# Patient Record
Sex: FEMALE | Race: ASIAN | Employment: FULL TIME | URBAN - METROPOLITAN AREA
[De-identification: names, ages, dates, MRNs, and addresses within clinical notes are randomized per-mention and may not be internally consistent; named-entity substitution may affect disease eponyms.]

---

## 2019-03-21 NOTE — PROGRESS NOTES
Melisa Walls Consultation    Dear Dr. Bao Alexander    Thank you for requesting ultrasound evaluation and maternal fetal medicine consultation on your patient Thierry Gunnjaelyn.   As you are aware she is a 26year old female with a Singlet a level II ultrasound today which I interpreted the results and reviewed them with the patient. Indication: gdm. Maternal age (26 years). ____________________________________________________________________________  History: Age: 26 years.  Maternal Male fetus. Summary of Ultrasound Findings:  Impression: The fetal measurements are consistent with the established EDC. No gross ultrasound evidence of structural abnormalities are seen today. No minor markers for aneuploidy are seen.  The limitations Complications    Women 28years of age or older can expect to experience two to three fold higher rates of hospitalization,  delivery, and pregnancy-related complications when compared to their younger counterparts.   The two most common medical pro to occur with increased frequency in offspring of older women. These abnormalities are structural and unrelated to aneuploidy, thus they would not be detected by karyotype analysis.   For these reasons a complete, detailed ultrasound (level II) is advised e cervical length is 6 mm. There is a large U-shaped funnel and the fetal feet are seen moving in and out of the cervical canal.    I discussed the above findings with the patient.   I reviewed the increased risk of  delivery associated with a short c ultrasound  · Ultrasound findings consistent with cervical incompetence  · Advanced maternal age, low risk cfDNA.   Invasive testing declined  · H/o GDM, early screen was negative    RECOMMENDATIONS:  · Continue care with Dr. Bessie Doyle

## 2019-03-28 ENCOUNTER — HOSPITAL ENCOUNTER (OUTPATIENT)
Facility: HOSPITAL | Age: 34
Setting detail: OBSERVATION
Discharge: HOME | End: 2019-03-29
Attending: OBSTETRICS & GYNECOLOGY | Admitting: OBSTETRICS & GYNECOLOGY
Payer: COMMERCIAL

## 2019-03-28 ENCOUNTER — OFFICE VISIT (OUTPATIENT)
Dept: PERINATAL CARE | Facility: HOSPITAL | Age: 34
End: 2019-03-28
Attending: OBSTETRICS & GYNECOLOGY
Payer: COMMERCIAL

## 2019-03-28 ENCOUNTER — ANESTHESIA EVENT (OUTPATIENT)
Dept: OBGYN UNIT | Facility: HOSPITAL | Age: 34
End: 2019-03-28

## 2019-03-28 ENCOUNTER — ANESTHESIA (OUTPATIENT)
Dept: OBGYN UNIT | Facility: HOSPITAL | Age: 34
End: 2019-03-28

## 2019-03-28 VITALS
BODY MASS INDEX: 27.19 KG/M2 | WEIGHT: 144 LBS | HEIGHT: 61 IN | HEART RATE: 79 BPM | SYSTOLIC BLOOD PRESSURE: 116 MMHG | DIASTOLIC BLOOD PRESSURE: 76 MMHG

## 2019-03-28 DIAGNOSIS — O09.522 MULTIGRAVIDA OF ADVANCED MATERNAL AGE IN SECOND TRIMESTER: ICD-10-CM

## 2019-03-28 DIAGNOSIS — O34.32 CERVICAL INCOMPETENCE DURING PREGNANCY IN SECOND TRIMESTER: ICD-10-CM

## 2019-03-28 DIAGNOSIS — O34.32 CERVICAL INCOMPETENCE DURING PREGNANCY IN SECOND TRIMESTER: Primary | ICD-10-CM

## 2019-03-28 PROBLEM — Z34.90 PREGNANCY (HCC): Status: ACTIVE | Noted: 2019-03-28

## 2019-03-28 PROBLEM — O09.529 AMA (ADVANCED MATERNAL AGE) MULTIGRAVIDA 35+: Status: ACTIVE | Noted: 2019-03-28

## 2019-03-28 PROBLEM — Z34.90 PREGNANCY: Status: ACTIVE | Noted: 2019-03-28

## 2019-03-28 PROBLEM — O09.529 AMA (ADVANCED MATERNAL AGE) MULTIGRAVIDA 35+ (HCC): Status: ACTIVE | Noted: 2019-03-28

## 2019-03-28 PROCEDURE — 0UVC7ZZ RESTRICTION OF CERVIX, VIA NATURAL OR ARTIFICIAL OPENING: ICD-10-PCS | Performed by: OBSTETRICS & GYNECOLOGY

## 2019-03-28 PROCEDURE — 76811 OB US DETAILED SNGL FETUS: CPT | Performed by: OBSTETRICS & GYNECOLOGY

## 2019-03-28 PROCEDURE — 76817 TRANSVAGINAL US OBSTETRIC: CPT | Performed by: OBSTETRICS & GYNECOLOGY

## 2019-03-28 PROCEDURE — 99245 OFF/OP CONSLTJ NEW/EST HI 55: CPT | Performed by: OBSTETRICS & GYNECOLOGY

## 2019-03-28 PROCEDURE — 59320 REVISION OF CERVIX: CPT

## 2019-03-28 PROCEDURE — 85025 COMPLETE CBC W/AUTO DIFF WBC: CPT | Performed by: OBSTETRICS & GYNECOLOGY

## 2019-03-28 RX ORDER — DIPHENHYDRAMINE HYDROCHLORIDE 50 MG/ML
25 INJECTION INTRAMUSCULAR; INTRAVENOUS ONCE AS NEEDED
Status: ACTIVE | OUTPATIENT
Start: 2019-03-28 | End: 2019-03-28

## 2019-03-28 RX ORDER — CEFAZOLIN SODIUM/WATER 2 G/20 ML
2 SYRINGE (ML) INTRAVENOUS ONCE
Status: COMPLETED | OUTPATIENT
Start: 2019-03-28 | End: 2019-03-28

## 2019-03-28 RX ORDER — METRONIDAZOLE 500 MG/100ML
500 INJECTION, SOLUTION INTRAVENOUS EVERY 8 HOURS
Status: COMPLETED | OUTPATIENT
Start: 2019-03-29 | End: 2019-03-29

## 2019-03-28 RX ORDER — SODIUM CHLORIDE, SODIUM LACTATE, POTASSIUM CHLORIDE, CALCIUM CHLORIDE 600; 310; 30; 20 MG/100ML; MG/100ML; MG/100ML; MG/100ML
INJECTION, SOLUTION INTRAVENOUS CONTINUOUS
Status: DISCONTINUED | OUTPATIENT
Start: 2019-03-28 | End: 2019-03-29

## 2019-03-28 RX ORDER — CEFAZOLIN SODIUM/WATER 2 G/20 ML
2 SYRINGE (ML) INTRAVENOUS EVERY 8 HOURS
Status: ACTIVE | OUTPATIENT
Start: 2019-03-28 | End: 2019-03-28

## 2019-03-28 RX ORDER — HYDROMORPHONE HYDROCHLORIDE 1 MG/ML
0.4 INJECTION, SOLUTION INTRAMUSCULAR; INTRAVENOUS; SUBCUTANEOUS EVERY 5 MIN PRN
Status: DISCONTINUED | OUTPATIENT
Start: 2019-03-28 | End: 2019-03-29

## 2019-03-28 RX ORDER — INDOMETHACIN 50 MG/1
50 CAPSULE ORAL EVERY 6 HOURS
Status: DISCONTINUED | OUTPATIENT
Start: 2019-03-29 | End: 2019-03-29

## 2019-03-28 RX ORDER — CEFAZOLIN SODIUM/WATER 2 G/20 ML
2 SYRINGE (ML) INTRAVENOUS EVERY 8 HOURS
Status: COMPLETED | OUTPATIENT
Start: 2019-03-29 | End: 2019-03-29

## 2019-03-28 RX ORDER — MEPERIDINE HYDROCHLORIDE 25 MG/ML
12.5 INJECTION INTRAMUSCULAR; INTRAVENOUS; SUBCUTANEOUS ONCE AS NEEDED
Status: ACTIVE | OUTPATIENT
Start: 2019-03-28 | End: 2019-03-28

## 2019-03-28 RX ORDER — METRONIDAZOLE 500 MG/100ML
500 INJECTION, SOLUTION INTRAVENOUS ONCE
Status: COMPLETED | OUTPATIENT
Start: 2019-03-28 | End: 2019-03-28

## 2019-03-28 RX ORDER — CEFAZOLIN SODIUM/WATER 2 G/20 ML
SYRINGE (ML) INTRAVENOUS
Status: DISCONTINUED | OUTPATIENT
Start: 2019-03-28 | End: 2019-03-29

## 2019-03-28 RX ORDER — ONDANSETRON 2 MG/ML
4 INJECTION INTRAMUSCULAR; INTRAVENOUS ONCE AS NEEDED
Status: ACTIVE | OUTPATIENT
Start: 2019-03-28 | End: 2019-03-28

## 2019-03-28 RX ORDER — NALBUPHINE HCL 10 MG/ML
2.5 AMPUL (ML) INJECTION
Status: DISCONTINUED | OUTPATIENT
Start: 2019-03-28 | End: 2019-03-29

## 2019-03-28 RX ORDER — KETOROLAC TROMETHAMINE 30 MG/ML
30 INJECTION, SOLUTION INTRAMUSCULAR; INTRAVENOUS ONCE AS NEEDED
Status: ACTIVE | OUTPATIENT
Start: 2019-03-28 | End: 2019-03-28

## 2019-03-28 RX ORDER — INDOMETHACIN 50 MG/1
100 CAPSULE ORAL ONCE
Status: COMPLETED | OUTPATIENT
Start: 2019-03-28 | End: 2019-03-28

## 2019-03-28 RX ORDER — METRONIDAZOLE 500 MG/100ML
500 INJECTION, SOLUTION INTRAVENOUS EVERY 8 HOURS
Status: ACTIVE | OUTPATIENT
Start: 2019-03-28 | End: 2019-03-28

## 2019-03-28 NOTE — ANESTHESIA POSTPROCEDURE EVALUATION
[address] [facility] Patient Status:  Observation   Age/Gender 26year old female MRN [id number]   Location [address] Attending Lea Krishnan MD   [facility] Day # 0 PCP No primary care provider on file.        Anesthesia

## 2019-03-28 NOTE — H&P
[facility]  Maternal-Fetal Medicine H&P    Date of Admission:  3/28/2019  Date of Consult:  3/28/2019      History of Present Illness:  Nicholas El is a a(n) 26year old female.   with an IUP at Gestational Age: 19w4d  Who  Pr Intravenous, Once  •  ceFAZolin sodium (ANCEF/KEFZOL) 2 GM/20ML premix IV syringe 2 g, 2 g, Intravenous, Once  •  indomethacin (INDOCIN) cap 100 mg, 100 mg, Oral, Once        NARRATIVE:   PREOPERATIVE COUNSELING  I reviewed that cerclage may help reduce th

## 2019-03-28 NOTE — ANESTHESIA PREPROCEDURE EVALUATION
PRE-OP EVALUATION    Patient Name: Lovely Johnston    Pre-op Diagnosis: cervical cerclage placement due to a finding of an incompetent cervix today at her routine level 2 ultrasound.     Procedure(s):      Surgeon(s) and Role:     * Liudmila Lopez MD - MCV 87.4 03/28/2019    MCH 30.0 03/28/2019    MCHC 34.4 03/28/2019    RDW 13.4 03/28/2019    .0 03/28/2019               Airway      Mallampati: II  Mouth opening: >3 FB  TM distance: > 6 cm  Neck ROM: full Cardiovascular    Cardiovascular exam n

## 2019-03-28 NOTE — PROGRESS NOTES
Pt to room 118 via bed in stable condition. Dr. Raheem Way spoke to patient and  about procedure and plan of care.

## 2019-03-28 NOTE — PROGRESS NOTES
Pt is a 26year old female admitted to 118/118-A. Admitted for cerclage placement. Pt is  20w3d intra-uterine pregnancy. History obtained, consents signed. Oriented to room, staff, and plan of care.

## 2019-03-28 NOTE — OPERATIVE REPORT
Date of operation: 3/28/19    Preoperative Diagnosis:  1.    20w3d IUP                                      2.   Incompetent cervix diagnosed by ultrasound today       Postoperative diagnosis:    Same    Procedure:   Rescue Cerclage    Surgeon:  Tomas Juan

## 2019-03-29 VITALS
RESPIRATION RATE: 18 BRPM | TEMPERATURE: 98 F | OXYGEN SATURATION: 100 % | BODY MASS INDEX: 27.73 KG/M2 | WEIGHT: 145 LBS | DIASTOLIC BLOOD PRESSURE: 54 MMHG | HEART RATE: 76 BPM | SYSTOLIC BLOOD PRESSURE: 99 MMHG | HEIGHT: 60.51 IN

## 2019-03-29 PROCEDURE — 81002 URINALYSIS NONAUTO W/O SCOPE: CPT

## 2019-03-29 RX ORDER — INDOMETHACIN 25 MG/1
25 CAPSULE ORAL EVERY 6 HOURS
Qty: 8 CAPSULE | Refills: 0 | Status: SHIPPED | OUTPATIENT
Start: 2019-03-29 | End: 2019-03-31

## 2019-03-29 RX ORDER — INDOMETHACIN 25 MG/1
25 CAPSULE ORAL EVERY 6 HOURS
Status: DISCONTINUED | OUTPATIENT
Start: 2019-03-29 | End: 2019-03-29

## 2019-03-29 NOTE — PROGRESS NOTES
Report received from Mary Saxena, [facility]. Patient resting comfortably in bed. POC discussed with patient. All questions answered. Patient verbalized understanding. Call light within reach.

## 2019-03-29 NOTE — PROGRESS NOTES
Pt pharmacy on record called to ensure medications prescribed can be filled. Pharmacy does NOT have indocin 25 mg in stock and would need to order for tomorrow.  Order cancelled as pt needs medication today, THE [facility] outpatient pharmacy called for possible fi

## 2019-03-29 NOTE — PROGRESS NOTES
Pt d/c per w/c accompanied by OBT after written d/c instructions verbally explained and written copy of instructions given. Pt verbalizes understandning of follow up, complication and discharge instructions.  Pt has indocin filled by Yudy Zepeda and naila

## 2019-03-29 NOTE — CONSULTS
[address] Gabri[address] Adair Giron Patient Status:  Observation    1985 MRN [id number]   Location [address] [facility] Attending Mehdi Harrison MD   Hosp Day # 0 PCP No primary care provider on file.      Trae Easton Summary (Last 24 hours) at 3/29/2019 0930  Last data filed at 3/29/2019 0900  Gross per 24 hour   Intake 540 ml   Output 2350 ml   Net -1810 ml       Physical Exam:  General appearance: alert, appears stated age, cooperative and no distress    Heart: regul

## 2019-03-29 NOTE — PROGRESS NOTES
Lauri Benton  Dear Dr. Lucie Otto,     Thank you for requesting ultrasound evaluation and maternal fetal medicine consultation on your patient [facility]) Rima Hitchcock.   As you are aware she is a 26year old female  wi 7. 5 mm.  ____________________________________________________________________________  Comments:  Impression:  1. IUP at 21w2d  2. Incompetent cervix s/p rescue cerclage, cerclage is intact.     DISCUSSION  During her visit we discussed and reviewed the fo

## 2019-03-29 NOTE — PLAN OF CARE
Problem: ANTEPARTUM/LABOR and DELIVERY  Goal: Maintain pregnancy as long as maternal and/or fetal condition is stable  INTERVENTIONS:  - Maternal surveillance  - Fetal surveillance  - Monitor uterine activity  - Medications as ordered  - Bedrest  Outcome: Progressing

## 2019-04-03 ENCOUNTER — OFFICE VISIT (OUTPATIENT)
Dept: PERINATAL CARE | Facility: HOSPITAL | Age: 34
End: 2019-04-03
Attending: OBSTETRICS & GYNECOLOGY
Payer: COMMERCIAL

## 2019-04-03 VITALS — HEART RATE: 92 BPM | SYSTOLIC BLOOD PRESSURE: 121 MMHG | DIASTOLIC BLOOD PRESSURE: 84 MMHG

## 2019-04-03 DIAGNOSIS — O09.522 MULTIGRAVIDA OF ADVANCED MATERNAL AGE IN SECOND TRIMESTER: ICD-10-CM

## 2019-04-03 DIAGNOSIS — O34.32 CERVICAL INCOMPETENCE DURING PREGNANCY IN SECOND TRIMESTER: ICD-10-CM

## 2019-04-03 DIAGNOSIS — O34.32 CERVICAL CERCLAGE SUTURE PRESENT IN SECOND TRIMESTER: ICD-10-CM

## 2019-04-03 PROCEDURE — 76817 TRANSVAGINAL US OBSTETRIC: CPT | Performed by: OBSTETRICS & GYNECOLOGY

## 2019-04-03 PROCEDURE — 99214 OFFICE O/P EST MOD 30 MIN: CPT | Performed by: OBSTETRICS & GYNECOLOGY

## 2019-04-03 PROCEDURE — 76815 OB US LIMITED FETUS(S): CPT | Performed by: OBSTETRICS & GYNECOLOGY

## 2019-04-18 NOTE — PROGRESS NOTES
Martina Dodge    Dear Dr. Velvet Luu    Thank you for requesting ultrasound evaluation and maternal fetal medicine consultation on your patient Shagufta Blanchard.   As you are aware she is a 26year old female  with a si g  54th%     Fetal heart activity: present. Fetal heart rate: 148 bpm.   Fetal presentation: cephalic. Amniotic fluid: normal.   Cord: normal.   Placenta: anterior. Fetal Anatomy:  Visualized with normal appearance: head, kidneys, bladder.     Sonal Chen and coordination of care. Our discussion is summarized above. The approximate physician face-to-face time was 40 minutes.

## 2019-04-19 ENCOUNTER — OFFICE VISIT (OUTPATIENT)
Dept: PERINATAL CARE | Facility: HOSPITAL | Age: 34
End: 2019-04-19
Attending: OBSTETRICS & GYNECOLOGY
Payer: COMMERCIAL

## 2019-04-19 ENCOUNTER — HOSPITAL ENCOUNTER (INPATIENT)
Facility: HOSPITAL | Age: 34
LOS: 12 days | Discharge: HOME | End: 2019-05-01
Attending: OBSTETRICS & GYNECOLOGY | Admitting: OBSTETRICS & GYNECOLOGY
Payer: COMMERCIAL

## 2019-04-19 VITALS
HEART RATE: 111 BPM | DIASTOLIC BLOOD PRESSURE: 84 MMHG | SYSTOLIC BLOOD PRESSURE: 134 MMHG | BODY MASS INDEX: 28 KG/M2 | WEIGHT: 147 LBS

## 2019-04-19 DIAGNOSIS — O09.522 MULTIGRAVIDA OF ADVANCED MATERNAL AGE IN SECOND TRIMESTER: ICD-10-CM

## 2019-04-19 DIAGNOSIS — O34.32 INCOMPETENT CERVIX DURING SECOND TRIMESTER, ANTEPARTUM: ICD-10-CM

## 2019-04-19 DIAGNOSIS — O34.32 CERVICAL INCOMPETENCE DURING PREGNANCY IN SECOND TRIMESTER: ICD-10-CM

## 2019-04-19 PROCEDURE — 76805 OB US >/= 14 WKS SNGL FETUS: CPT | Performed by: OBSTETRICS & GYNECOLOGY

## 2019-04-19 PROCEDURE — 76817 TRANSVAGINAL US OBSTETRIC: CPT | Performed by: OBSTETRICS & GYNECOLOGY

## 2019-04-19 PROCEDURE — 99215 OFFICE O/P EST HI 40 MIN: CPT | Performed by: OBSTETRICS & GYNECOLOGY

## 2019-04-19 RX ORDER — SODIUM CHLORIDE, SODIUM LACTATE, POTASSIUM CHLORIDE, CALCIUM CHLORIDE 600; 310; 30; 20 MG/100ML; MG/100ML; MG/100ML; MG/100ML
INJECTION, SOLUTION INTRAVENOUS CONTINUOUS
Status: DISCONTINUED | OUTPATIENT
Start: 2019-04-19 | End: 2019-04-27

## 2019-04-19 RX ORDER — INDOMETHACIN 25 MG/1
25 CAPSULE ORAL EVERY 6 HOURS
Status: DISCONTINUED | OUTPATIENT
Start: 2019-04-19 | End: 2019-04-19

## 2019-04-19 RX ORDER — ACETAMINOPHEN 500 MG
1000 TABLET ORAL EVERY 6 HOURS PRN
Status: DISCONTINUED | OUTPATIENT
Start: 2019-04-19 | End: 2019-04-27

## 2019-04-19 RX ORDER — BETAMETHASONE SODIUM PHOSPHATE AND BETAMETHASONE ACETATE 3; 3 MG/ML; MG/ML
12 INJECTION, SUSPENSION INTRA-ARTICULAR; INTRALESIONAL; INTRAMUSCULAR; SOFT TISSUE EVERY 24 HOURS
Status: COMPLETED | OUTPATIENT
Start: 2019-04-19 | End: 2019-04-20

## 2019-04-19 RX ORDER — INDOMETHACIN 50 MG/1
50 CAPSULE ORAL ONCE
Status: DISCONTINUED | OUTPATIENT
Start: 2019-04-19 | End: 2019-04-19

## 2019-04-19 RX ORDER — DOCUSATE SODIUM 100 MG/1
100 CAPSULE, LIQUID FILLED ORAL 2 TIMES DAILY
Status: DISCONTINUED | OUTPATIENT
Start: 2019-04-19 | End: 2019-04-27

## 2019-04-19 RX ORDER — ZOLPIDEM TARTRATE 5 MG/1
5 TABLET ORAL NIGHTLY PRN
Status: DISCONTINUED | OUTPATIENT
Start: 2019-04-19 | End: 2019-04-27

## 2019-04-19 RX ORDER — CALCIUM CARBONATE 200(500)MG
1000 TABLET,CHEWABLE ORAL
Status: DISCONTINUED | OUTPATIENT
Start: 2019-04-19 | End: 2019-04-27

## 2019-04-19 RX ORDER — CHOLECALCIFEROL (VITAMIN D3) 25 MCG
1 TABLET,CHEWABLE ORAL DAILY
Status: DISCONTINUED | OUTPATIENT
Start: 2019-04-19 | End: 2019-04-27

## 2019-04-19 RX ORDER — ACETAMINOPHEN 500 MG
500 TABLET ORAL EVERY 6 HOURS PRN
Status: DISCONTINUED | OUTPATIENT
Start: 2019-04-19 | End: 2019-04-27

## 2019-04-19 NOTE — PROGRESS NOTES
Pt admitted by wheelchair per [facility] nurse and report received. Orientated to room and unit, POC discussed and questions answered. EFM tested andpatient placed on monitor. Dr Andi Francisco aware of admission.

## 2019-04-19 NOTE — PLAN OF CARE
Problem: ANTEPARTUM/LABOR and DELIVERY  Goal: Maintain pregnancy as long as maternal and/or fetal condition is stable  Description  INTERVENTIONS:  - Maternal surveillance  - Fetal surveillance  - Monitor uterine activity  - Medications as ordered  - Bed

## 2019-04-20 NOTE — CONSULTS
[facility]  Maternal-Fetal Medicine Inpatient Consultation    Date of Admission:  4/19/2019  Date of Consult:  4/20/2019    Reason for Consult:   Incompetent cervix, recent progression with advanced dilation     History of Present Illness:  MONIQUE smokeless tobacco. She reports that she does not drink alcohol or use drugs.     Allergies:  No Known Allergies    Medications:    Current Facility-Administered Medications:   •  prenatal multivitamin plus DHA (PNV with DHA) cap 1 capsule, 1 capsule, Oral,  care plan and fetal position. If the fetus remains cephalic, a vaginal delivery is preferred regardless of the gestational age.   If the fetus is in a non-cephalic position, and they want to be aggressive with  care, then a  would

## 2019-04-21 NOTE — PROGRESS NOTES
Report to BRUNO Kasper RN at this time. POC discussed and enforced. Pt stable in bed with IV saline locked and call light in reach. Pt verbalized understanding of POC.

## 2019-04-21 NOTE — PROGRESS NOTES
Pt put call light on at this time, this RN went in to assess pt and pt states she \"feels wet\" on her pantyliner. Small amount of mucus noted. Nitrazine tested on mucus and negative result for ROM was obtained.  Informed pt of result and told pt to monitor

## 2019-04-21 NOTE — PROGRESS NOTES
Report from [address] @ this time. POC discussed and will enforce. Pt stable in bed with IV saline locked and call light in reach. Pt verbalized understanding of POC. Will continue to monitor.

## 2019-04-21 NOTE — PROGRESS NOTES
S: Had some thin mucous come out of the vagina last night. No blood, no LOF, was tested and nitrazine negative. No further leaking. Baby moving, no ctx.     O:     04/21/19  0450   BP: 106/63   Pulse: 67   Resp: 16   Temp: 97.6 °F (36.4 °C)    mod va

## 2019-04-22 NOTE — PROGRESS NOTES
NICU Prenatal Consult Note  I had the pleasure of speaking with Mr and Mrs Willow Marinelli today regarding their 24 week pregnancy complicated by  labor.  I spoke with the family regarding the complications surrounding a 25 week gestational age baby, farzanehin

## 2019-04-22 NOTE — PROGRESS NOTES
Patient resting in bed. Plan of care discussed and questions answered. Patient denies cramping, hector, bleeding or leaking fluid. Reports + fetal movement. Call light within reach.

## 2019-04-23 NOTE — IMAGING NOTE
Indication: Bleeding, Cerclage. ____________________________________________________________________________  History: Age: 26 years. Maternal age at [facility]: 26 years. : 2 Para: 1.  Last menstrual period: 2018.  _________________________________

## 2019-04-23 NOTE — CONSULTS
[facility]  Maternal-Fetal Medicine Inpatient Consultation    Date of Admission:  4/19/2019  Date of Consult:  4/23/2019    Reason for Consult:   Incompetent cervix, recent progression with advanced dilation. Spotting blood today.     Histor smoked. She has never used smokeless tobacco. She reports that she does not drink alcohol or use drugs.     Allergies:  No Known Allergies    Medications:    Current Facility-Administered Medications:   •  magnesium sulfate 40mg/ml infusion, 2 g/hr, Anthony Pacheco needed. We also discussed that if she has SROM, we can continue expectant management provided there are no signs of abruption, infection or labor. _____________________________________________  History: Age: 26 years. Maternal age at [facility]: 26 years.  G

## 2019-04-23 NOTE — PROGRESS NOTES
[facility]  Labor Progress Note      SUBJECTIVE:  Patient is stable. 24 1/7 with incompetent cervix. Magnesium started this am for BS and slight cervical change. MFM at bedside, CL stable 2.3cm (2.2 on 4/19/19)  US stable.   Dr Berlin Boxer states stitch n

## 2019-04-23 NOTE — PLAN OF CARE
Problem: ANTEPARTUM/LABOR and DELIVERY  Goal: Maintain pregnancy as long as maternal and/or fetal condition is stable  Description  INTERVENTIONS:  - Maternal surveillance  - Fetal surveillance  - Monitor uterine activity  - Medications as ordered  - Bed Goal  Description  Patient's Long Term Goal: maintain pregnancy    Interventions:  -  - See additional Care Plan goals for specific interventions    4/23/2019 1011 by Mitch Solomon RN  Outcome: Progressing  4/23/2019 1010 by Mitch Solomon RN

## 2019-04-23 NOTE — CM/SW NOTE
Team rounds done on patient. Team reviewed patient plan of care, patient orders reviewed, and any possible discharge needs reviewed. Team members present: BOB Still; Karen Gillespie, Behavioral Health; Viki Dobbins RN CM, and RN caring for patient.

## 2019-04-23 NOTE — CM/SW NOTE
04/23/19 1600   Referral Data   Referral Source Nurse   Referral Reason Counseling/support;Psychosocial assessment   OTHER   Post-partum risk assessment score 9     SW received orders to assess patient.  Pt received a 9 on her [address]

## 2019-04-23 NOTE — PLAN OF CARE
Problem: ANTEPARTUM/LABOR and DELIVERY  Goal: Maintain pregnancy as long as maternal and/or fetal condition is stable  Description  INTERVENTIONS:  - Maternal surveillance  - Fetal surveillance  - Monitor uterine activity  - Medications as ordered  - Bed to identify coping skills, available support systems and cultural and spiritual values  - Provide emotional support, including active listening and acknowledgement of concerns of patient and caregivers  - Reduce environmental stimuli, as able  - Instruct p

## 2019-04-24 NOTE — PROGRESS NOTES
[facility]      Antepartum    C/ [address] Patient Status:  Inpatient    1985 MRN [id number]   Location 18[address] [facility] Attending Lexi Chong MD   Hosp Day # 5 PCP No primary care provider on file.      Denver Aparicio

## 2019-04-24 NOTE — CONSULTS
[facility]    Maternal Fetal Medicine Progress Note    Dwaine Salinas Patient Status:  Inpatient    1985 MRN [id number]   Location 1818 [facility] Attending Conrad Cheadle, MD   [facility] Day [facility] PCP No primary care provid membranes   · Advanced maternal age  · History of     RECOMENDATIONS:   · Continue inpatient bed rest and continue uterine monitoring for contractions.   She may resume intermittent fetal monitoring     Questions/concerns were discussed with kerri

## 2019-04-24 NOTE — PROGRESS NOTES
Called for small spot of bleeding when wiping. Examined washcloth, 2 small spots, one quarter size, other dime sized of dark red blood with mucous. Patient has no other complaints, no pressure or pain.  No contractions on toco. Will continue close observat

## 2019-04-24 NOTE — PROGRESS NOTES
After voiding pt wiped and had a quarter size spot of bright red blood. . Had smaller amts brownish drainage earlier. Placed back on EFM. No contractions. Pt states that she doesn't feel any different. DR. Raymond Kirk notified.

## 2019-04-25 NOTE — CONSULTS
[facility]    Maternal Fetal Medicine Progress Note    Dwaine Elias Patient Status:  Inpatient    1985 MRN [id number]   Location 1818 [facility] Attending Jairo Rojas MD   Hosp Day # 6 PCP No primary care provid

## 2019-04-25 NOTE — PROGRESS NOTES
Pt called to use bedpan. Mucous tinged with red blood noted on perineum. Pt very concerned about this because she states that this is the most she has had since admission. Substance noted to be mostly mucous with only scant amount of blood.   Pt denies c

## 2019-04-25 NOTE — PROGRESS NOTES
[facility]      Antepartum    C/ [address] Patient Status:  Inpatient    1985 MRN [id number]   Location 1818 [facility] Attending Diana Sotelo MD   Hosp Day # 6 PCP No primary care provider on file.      Patrick Jones the above with patient and spouse.    Erick Croft  4/25/2019  11:54 AM

## 2019-04-26 RX ORDER — INDOMETHACIN 50 MG/1
CAPSULE ORAL
Status: COMPLETED
Start: 2019-04-26 | End: 2019-04-26

## 2019-04-26 RX ORDER — TERBUTALINE SULFATE 1 MG/ML
INJECTION, SOLUTION SUBCUTANEOUS
Status: COMPLETED
Start: 2019-04-26 | End: 2019-04-26

## 2019-04-26 RX ORDER — CALCIUM GLUCONATE 94 MG/ML
1 INJECTION, SOLUTION INTRAVENOUS ONCE AS NEEDED
Status: DISCONTINUED | OUTPATIENT
Start: 2019-04-26 | End: 2019-04-27

## 2019-04-26 RX ORDER — INDOMETHACIN 50 MG/1
50 CAPSULE ORAL EVERY 6 HOURS
Status: DISCONTINUED | OUTPATIENT
Start: 2019-04-27 | End: 2019-04-27

## 2019-04-26 RX ORDER — INDOMETHACIN 50 MG/1
100 CAPSULE ORAL ONCE
Status: COMPLETED | OUTPATIENT
Start: 2019-04-26 | End: 2019-04-26

## 2019-04-26 RX ORDER — TERBUTALINE SULFATE 1 MG/ML
0.25 INJECTION, SOLUTION SUBCUTANEOUS ONCE
Status: COMPLETED | OUTPATIENT
Start: 2019-04-26 | End: 2019-04-26

## 2019-04-26 NOTE — PLAN OF CARE
Problem: ANTEPARTUM/LABOR and DELIVERY  Goal: Maintain pregnancy as long as maternal and/or fetal condition is stable  Description  INTERVENTIONS:  - Maternal surveillance  - Fetal surveillance  - Monitor uterine activity  - Medications as ordered  - Bed patient/family to identify coping skills, available support systems and cultural and spiritual values  - Provide emotional support, including active listening and acknowledgement of concerns of patient and caregivers  - Reduce environmental stimuli, as abl

## 2019-04-26 NOTE — PROGRESS NOTES
Called by RN secondary to patient reporting concern for possible contractions. [place] with no activity but patient feeling as though she is having \"cramping\" every so often.   Continues to have a slightly bloody tinged mucous discharge with wiping but no a

## 2019-04-26 NOTE — PROGRESS NOTES
Pt noted a small amount of bloody mucous when wiping. Pt denies cramping,hector or leaking fluid. Will continue with current management as explained to pt by Dr. Noé Le. Pt verbalizes understanding of POC.

## 2019-04-26 NOTE — PROGRESS NOTES
[facility]      Antepartum    C/ [address]enny [address] Patient Status:  Inpatient    1985 MRN [id number]   Location 1818 [facility] Attending Analy Erickson MD   Hosp Day # 7 PCP No primary care provider on file.      Luis F Guillory

## 2019-04-26 NOTE — PROGRESS NOTES
EFMs removed, FHTs 155. Pt assisted to bedpan. Pericare done per pt. [address] changed. Pt denies any needs at this time. Pt states IV is getting sore. IV flushes easily, no signs of infiltration.  This RN will talk with Dr Ed Rose about keeping IV current and

## 2019-04-26 NOTE — PROGRESS NOTES
After voiding the pt wiped and noted a moderate amount of bloody mucous on her tissue. Pt denies cramping or leaking fluid. Pt denies pain but states she feels pressure. Will notify Dr Nhung Hitchcock of pt's complaints.

## 2019-04-27 ENCOUNTER — ANESTHESIA EVENT (OUTPATIENT)
Dept: OBGYN UNIT | Facility: HOSPITAL | Age: 34
End: 2019-04-27
Payer: COMMERCIAL

## 2019-04-27 ENCOUNTER — ANESTHESIA (OUTPATIENT)
Dept: OBGYN UNIT | Facility: HOSPITAL | Age: 34
End: 2019-04-27
Payer: COMMERCIAL

## 2019-04-27 PROCEDURE — 59514 CESAREAN DELIVERY ONLY: CPT | Performed by: OBSTETRICS & GYNECOLOGY

## 2019-04-27 RX ORDER — METOCLOPRAMIDE 10 MG/1
10 TABLET ORAL ONCE
Status: DISCONTINUED | OUTPATIENT
Start: 2019-04-27 | End: 2019-04-27

## 2019-04-27 RX ORDER — MORPHINE SULFATE 0.5 MG/ML
2 INJECTION, SOLUTION EPIDURAL; INTRATHECAL; INTRAVENOUS ONCE
Status: DISCONTINUED | OUTPATIENT
Start: 2019-04-27 | End: 2019-04-27

## 2019-04-27 RX ORDER — METOCLOPRAMIDE HYDROCHLORIDE 5 MG/ML
10 INJECTION INTRAMUSCULAR; INTRAVENOUS ONCE
Status: DISCONTINUED | OUTPATIENT
Start: 2019-04-27 | End: 2019-04-27

## 2019-04-27 RX ORDER — HYDROCODONE BITARTRATE AND ACETAMINOPHEN 5; 325 MG/1; MG/1
1 TABLET ORAL EVERY 4 HOURS PRN
Status: DISCONTINUED | OUTPATIENT
Start: 2019-04-27 | End: 2019-05-01

## 2019-04-27 RX ORDER — KETOROLAC TROMETHAMINE 30 MG/ML
30 INJECTION, SOLUTION INTRAMUSCULAR; INTRAVENOUS EVERY 6 HOURS PRN
Status: DISPENSED | OUTPATIENT
Start: 2019-04-27 | End: 2019-04-28

## 2019-04-27 RX ORDER — DEXTROSE, SODIUM CHLORIDE, SODIUM LACTATE, POTASSIUM CHLORIDE, AND CALCIUM CHLORIDE 5; .6; .31; .03; .02 G/100ML; G/100ML; G/100ML; G/100ML; G/100ML
INJECTION, SOLUTION INTRAVENOUS CONTINUOUS
Status: DISCONTINUED | OUTPATIENT
Start: 2019-04-27 | End: 2019-05-01

## 2019-04-27 RX ORDER — KETOROLAC TROMETHAMINE 30 MG/ML
INJECTION, SOLUTION INTRAMUSCULAR; INTRAVENOUS
Status: DISPENSED
Start: 2019-04-27 | End: 2019-04-28

## 2019-04-27 RX ORDER — NALBUPHINE HCL 10 MG/ML
2.5 AMPUL (ML) INJECTION
Status: DISCONTINUED | OUTPATIENT
Start: 2019-04-27 | End: 2019-04-27 | Stop reason: HOSPADM

## 2019-04-27 RX ORDER — DIPHENHYDRAMINE HYDROCHLORIDE 50 MG/ML
12.5 INJECTION INTRAMUSCULAR; INTRAVENOUS EVERY 4 HOURS PRN
Status: DISCONTINUED | OUTPATIENT
Start: 2019-04-27 | End: 2019-05-01

## 2019-04-27 RX ORDER — BETAMETHASONE SODIUM PHOSPHATE AND BETAMETHASONE ACETATE 3; 3 MG/ML; MG/ML
12 INJECTION, SUSPENSION INTRA-ARTICULAR; INTRALESIONAL; INTRAMUSCULAR; SOFT TISSUE EVERY 24 HOURS
Status: DISCONTINUED | OUTPATIENT
Start: 2019-04-27 | End: 2019-04-27

## 2019-04-27 RX ORDER — HYDROMORPHONE HYDROCHLORIDE 1 MG/ML
0.5 INJECTION, SOLUTION INTRAMUSCULAR; INTRAVENOUS; SUBCUTANEOUS EVERY 5 MIN PRN
Status: DISCONTINUED | OUTPATIENT
Start: 2019-04-27 | End: 2019-04-27 | Stop reason: HOSPADM

## 2019-04-27 RX ORDER — DIPHENHYDRAMINE HCL 25 MG
25 CAPSULE ORAL EVERY 4 HOURS PRN
Status: DISCONTINUED | OUTPATIENT
Start: 2019-04-27 | End: 2019-05-01

## 2019-04-27 RX ORDER — ZOLPIDEM TARTRATE 5 MG/1
5 TABLET ORAL NIGHTLY PRN
Status: DISCONTINUED | OUTPATIENT
Start: 2019-04-27 | End: 2019-05-01

## 2019-04-27 RX ORDER — ONDANSETRON 2 MG/ML
4 INJECTION INTRAMUSCULAR; INTRAVENOUS ONCE AS NEEDED
Status: DISCONTINUED | OUTPATIENT
Start: 2019-04-27 | End: 2019-04-27 | Stop reason: HOSPADM

## 2019-04-27 RX ORDER — CEFAZOLIN SODIUM/WATER 2 G/20 ML
2 SYRINGE (ML) INTRAVENOUS ONCE
Status: DISCONTINUED | OUTPATIENT
Start: 2019-04-27 | End: 2019-04-27

## 2019-04-27 RX ORDER — HYDROMORPHONE HYDROCHLORIDE 1 MG/ML
INJECTION, SOLUTION INTRAMUSCULAR; INTRAVENOUS; SUBCUTANEOUS
Status: DISPENSED
Start: 2019-04-27 | End: 2019-04-28

## 2019-04-27 RX ORDER — TRISODIUM CITRATE DIHYDRATE AND CITRIC ACID MONOHYDRATE 500; 334 MG/5ML; MG/5ML
SOLUTION ORAL
Status: COMPLETED
Start: 2019-04-27 | End: 2019-04-27

## 2019-04-27 RX ORDER — SODIUM CHLORIDE, SODIUM LACTATE, POTASSIUM CHLORIDE, CALCIUM CHLORIDE 600; 310; 30; 20 MG/100ML; MG/100ML; MG/100ML; MG/100ML
INJECTION, SOLUTION INTRAVENOUS CONTINUOUS
Status: DISCONTINUED | OUTPATIENT
Start: 2019-04-27 | End: 2019-04-28

## 2019-04-27 RX ORDER — ONDANSETRON 2 MG/ML
4 INJECTION INTRAMUSCULAR; INTRAVENOUS EVERY 6 HOURS PRN
Status: DISCONTINUED | OUTPATIENT
Start: 2019-04-27 | End: 2019-04-27

## 2019-04-27 RX ORDER — CEFAZOLIN SODIUM/WATER 2 G/20 ML
2 SYRINGE (ML) INTRAVENOUS EVERY 8 HOURS
Status: COMPLETED | OUTPATIENT
Start: 2019-04-27 | End: 2019-04-28

## 2019-04-27 RX ORDER — CEFAZOLIN SODIUM/WATER 2 G/20 ML
SYRINGE (ML) INTRAVENOUS
Status: COMPLETED
Start: 2019-04-27 | End: 2019-04-27

## 2019-04-27 RX ORDER — DEXTROSE, SODIUM CHLORIDE, SODIUM LACTATE, POTASSIUM CHLORIDE, AND CALCIUM CHLORIDE 5; .6; .31; .03; .02 G/100ML; G/100ML; G/100ML; G/100ML; G/100ML
INJECTION, SOLUTION INTRAVENOUS CONTINUOUS
Status: DISCONTINUED | OUTPATIENT
Start: 2019-04-27 | End: 2019-04-28

## 2019-04-27 RX ORDER — NALBUPHINE HCL 10 MG/ML
2.5 AMPUL (ML) INJECTION EVERY 4 HOURS PRN
Status: DISCONTINUED | OUTPATIENT
Start: 2019-04-27 | End: 2019-05-01

## 2019-04-27 RX ORDER — EPHEDRINE SULFATE/0.9% NACL/PF 25 MG/5 ML
5 SYRINGE (ML) INTRAVENOUS AS NEEDED
Status: DISCONTINUED | OUTPATIENT
Start: 2019-04-27 | End: 2019-04-27

## 2019-04-27 RX ORDER — FAMOTIDINE 20 MG/1
20 TABLET ORAL ONCE
Status: DISCONTINUED | OUTPATIENT
Start: 2019-04-27 | End: 2019-04-27

## 2019-04-27 RX ORDER — NALOXONE HYDROCHLORIDE 0.4 MG/ML
0.08 INJECTION, SOLUTION INTRAMUSCULAR; INTRAVENOUS; SUBCUTANEOUS
Status: ACTIVE | OUTPATIENT
Start: 2019-04-27 | End: 2019-04-28

## 2019-04-27 RX ORDER — HYDROCODONE BITARTRATE AND ACETAMINOPHEN 10; 325 MG/1; MG/1
1 TABLET ORAL EVERY 4 HOURS PRN
Status: DISCONTINUED | OUTPATIENT
Start: 2019-04-27 | End: 2019-05-01

## 2019-04-27 RX ORDER — METOCLOPRAMIDE HYDROCHLORIDE 5 MG/ML
10 INJECTION INTRAMUSCULAR; INTRAVENOUS EVERY 6 HOURS PRN
Status: DISCONTINUED | OUTPATIENT
Start: 2019-04-27 | End: 2019-05-01

## 2019-04-27 RX ORDER — BETAMETHASONE SODIUM PHOSPHATE AND BETAMETHASONE ACETATE 3; 3 MG/ML; MG/ML
INJECTION, SUSPENSION INTRA-ARTICULAR; INTRALESIONAL; INTRAMUSCULAR; SOFT TISSUE
Status: COMPLETED
Start: 2019-04-27 | End: 2019-04-27

## 2019-04-27 RX ORDER — CEFAZOLIN SODIUM/WATER 2 G/20 ML
SYRINGE (ML) INTRAVENOUS
Status: DISCONTINUED | OUTPATIENT
Start: 2019-04-27 | End: 2019-04-27 | Stop reason: HOSPADM

## 2019-04-27 RX ORDER — DIPHENHYDRAMINE HYDROCHLORIDE 50 MG/ML
25 INJECTION INTRAMUSCULAR; INTRAVENOUS ONCE AS NEEDED
Status: DISCONTINUED | OUTPATIENT
Start: 2019-04-27 | End: 2019-04-27 | Stop reason: HOSPADM

## 2019-04-27 RX ORDER — TRISODIUM CITRATE DIHYDRATE AND CITRIC ACID MONOHYDRATE 500; 334 MG/5ML; MG/5ML
30 SOLUTION ORAL ONCE
Status: COMPLETED | OUTPATIENT
Start: 2019-04-27 | End: 2019-04-27

## 2019-04-27 RX ORDER — ONDANSETRON 2 MG/ML
INJECTION INTRAMUSCULAR; INTRAVENOUS
Status: DISPENSED
Start: 2019-04-27 | End: 2019-04-27

## 2019-04-27 RX ORDER — HYDROMORPHONE HYDROCHLORIDE 1 MG/ML
0.5 INJECTION, SOLUTION INTRAMUSCULAR; INTRAVENOUS; SUBCUTANEOUS EVERY 2 HOUR PRN
Status: ACTIVE | OUTPATIENT
Start: 2019-04-27 | End: 2019-04-28

## 2019-04-27 RX ORDER — ONDANSETRON 2 MG/ML
4 INJECTION INTRAMUSCULAR; INTRAVENOUS EVERY 6 HOURS PRN
Status: DISCONTINUED | OUTPATIENT
Start: 2019-04-27 | End: 2019-05-01

## 2019-04-27 RX ORDER — FAMOTIDINE 10 MG/ML
20 INJECTION, SOLUTION INTRAVENOUS ONCE
Status: DISCONTINUED | OUTPATIENT
Start: 2019-04-27 | End: 2019-04-27

## 2019-04-27 RX ORDER — BISACODYL 10 MG
10 SUPPOSITORY, RECTAL RECTAL
Status: DISCONTINUED | OUTPATIENT
Start: 2019-04-27 | End: 2019-05-01

## 2019-04-27 RX ORDER — DEXTROSE MONOHYDRATE 25 G/50ML
50 INJECTION, SOLUTION INTRAVENOUS
Status: DISCONTINUED | OUTPATIENT
Start: 2019-04-27 | End: 2019-04-27

## 2019-04-27 RX ORDER — KETOROLAC TROMETHAMINE 30 MG/ML
30 INJECTION, SOLUTION INTRAMUSCULAR; INTRAVENOUS ONCE AS NEEDED
Status: COMPLETED | OUTPATIENT
Start: 2019-04-27 | End: 2019-04-27

## 2019-04-27 RX ORDER — IBUPROFEN 600 MG/1
600 TABLET ORAL EVERY 6 HOURS SCHEDULED
Status: DISCONTINUED | OUTPATIENT
Start: 2019-04-28 | End: 2019-04-28

## 2019-04-27 RX ORDER — SIMETHICONE 80 MG
80 TABLET,CHEWABLE ORAL 3 TIMES DAILY PRN
Status: DISCONTINUED | OUTPATIENT
Start: 2019-04-27 | End: 2019-05-01

## 2019-04-27 RX ORDER — DOCUSATE SODIUM 100 MG/1
100 CAPSULE, LIQUID FILLED ORAL
Status: DISCONTINUED | OUTPATIENT
Start: 2019-04-28 | End: 2019-05-01

## 2019-04-27 RX ORDER — NALBUPHINE HCL 10 MG/ML
2.5 AMPUL (ML) INJECTION
Status: DISCONTINUED | OUTPATIENT
Start: 2019-04-27 | End: 2019-04-27

## 2019-04-27 NOTE — CONSULTS
[facility]    Maternal Fetal Medicine Progress Note    Dwaine Salinas Patient Status:  Inpatient    1985 MRN [id number]   Location [address] [facility] Attending Conrad Cheadle, MD   Hosp Day # 8 PCP No primary care provid 24w5d  · Incompetent cervix, status post failed rescue cerclage with hourglassing membranes   · Advanced maternal age  · History of   · BREECH - ACD -feet protruding through cervical os    RECOMENDATIONS:   · Immediate delivery via  sectio

## 2019-04-27 NOTE — PROGRESS NOTES
Repeat ultrasound done by Dr Ana Rosa Forbes, revealed breech presentation which is different from last scan which was cephalic. Due to advanced dilation, BBOW and breech presentation it was recommended to proceed with .  Risks of the procedure were discuss

## 2019-04-27 NOTE — PROGRESS NOTES
Spoke with Joann Coffman in [address] regarding Baby's blood type. No rhogam is required since pt received rhogam 4/19.

## 2019-04-27 NOTE — OPERATIVE REPORT
[facility]   Section - Operative Note    Dwaine Burch Loss Patient Status:  Inpatient    1985 MRN [id number]   Location 1818 [facility] Attending Katherin Erwin MD   Hosp Day # 8 PCP No primary care provider on visualize the rest of the pelvis. The adhesion was dense and a good plane could not be found. Due to the low FHTs the adhesion was taken down just enough to allow for a uterine incision. A bladder flap was not able to be created.  A transverse incision was akins    Condition:   stable    Complications: None; patient tolerated the procedure well.       Cleo Duvall  4/27/2019  1:57 PM

## 2019-04-27 NOTE — PROGRESS NOTES
Called by RN for worsening pain with contractions. Contractions have spaced out to every 20-40 minutes but she is breathing through them. Bag of water no longer bulging, cervix felt to be 4-5cm. Fetal head palpated.  Recommend epidural and cerclage removal

## 2019-04-27 NOTE — PROGRESS NOTES
At 0550 pt set up in stirups, Dr Trinity Soto visualized [place]. Pt placed in trendelenburg position. Dr Trinity Soto consulted Dr Mckenna Garrett, level three MD. Dr Mckenna Garrett at bedside at 0600. Cerclage stitch removed.

## 2019-04-27 NOTE — PLAN OF CARE
Problem: ANTEPARTUM/LABOR and DELIVERY  Goal: Maintain pregnancy as long as maternal and/or fetal condition is stable  Description  INTERVENTIONS:  - Maternal surveillance  - Fetal surveillance  - Monitor uterine activity  - Medications as ordered  - Bed 1651 by Andrei Matias, RN  Outcome: Completed  4/27/2019 1042 by Andrei Matias, RN  Outcome: Progressing  Goal: Patient/Family Short Term Goal  Description  Patient's Short Term Goal:     Interventions:     - See additional Care Plan goals for specific i

## 2019-04-27 NOTE — PROGRESS NOTES
Dr. Kia Yeager at bedside reviewing POC for infant to pt and spouse. All questions answered. Pt and spouse both verbalize understanding.

## 2019-04-27 NOTE — PLAN OF CARE
Problem: ANTEPARTUM/LABOR and DELIVERY  Goal: Maintain pregnancy as long as maternal and/or fetal condition is stable  Description  INTERVENTIONS:  - Maternal surveillance  - Fetal surveillance  - Monitor uterine activity  - Medications as ordered  - Bed coping strategies  Description  INTERVENTIONS:  - Assist patient/family to identify coping skills, available support systems and cultural and spiritual values  - Provide emotional support, including active listening and acknowledgement of concerns of patie

## 2019-04-27 NOTE — ANESTHESIA PREPROCEDURE EVALUATION
PRE-OP EVALUATION    Patient Name: Shagufta Blanchard    Pre-op Diagnosis: * Breech  labor *    Procedure(s):  Repeat  section    Surgeon(s) and Role:     * Francie Membreno DO - Primary     * Mayra Dai MD - Assisting Surgeon    Pre-op mg Oral Once   [COMPLETED] MAGNESIUM SULFATE BOLUS FROM BAG 4 g infusion 4 g Intravenous Once   prenatal multivitamin plus DHA (PNV with DHA) cap 1 capsule 1 capsule Oral Daily   lactated ringers infusion  Intravenous Continuous   acetaminophen (TYLENOL EX Reinaldo Suggs MD at [facility] L[facility]D OR   •  DELIVERY ONLY       Social History    Tobacco Use      Smoking status: Never Smoker      Smokeless tobacco: Never Used    Alcohol use: No      Drug use: No     Available pre-op labs reviewed.   Lab Results   Component Va

## 2019-04-27 NOTE — IMAGING NOTE
History: Age: 26 years. Maternal age at [facility]: 26 years.  : 2 Para: 1.  ____________________________________________________________________________  Dating:  LMP: 2018 EDC: 2019 GA by LMP: 24w5d  ___________________________________________

## 2019-04-27 NOTE — PROGRESS NOTES
NURSING ADMISSION NOTE      Patient admitted via Cart  Oriented to room. Safety precautions initiated. Bed in low position. Call light in reach. Report received from [facility].

## 2019-04-27 NOTE — PROGRESS NOTES
Salome Martinez from lactation at bedside for consultation. POC discussed. Questions answered. Both patient and spouse verbalize understanding.

## 2019-04-27 NOTE — PROGRESS NOTES
Pt transferred to Mother Baby room 2197 in stable condition. Report given to [facility]. Infant visited in NICU prior to transfer.

## 2019-04-27 NOTE — ANESTHESIA POSTPROCEDURE EVALUATION
[address] [facility] Patient Status:  Inpatient   Age/Gender 26year old female MRN [id number]   Location 1818 [facility] Attending Tao Francois MD   Hosp Day # 8 PCP No primary care provider on file.        Anesthes

## 2019-04-27 NOTE — PROGRESS NOTES
Patient resting quietly in bed, dozing on and off. She states she is now feeling contractions every 15 minutes, still uncomfortable but improving. Will continue magnesium and indocin (next dose 0300).  Will re-check cervix if contractions worsen or become c

## 2019-04-27 NOTE — PROGRESS NOTES
Comfortable with epidural, Dr. Aguila Greenberg assisted with Abelardo [place]ston retractors to expose [place]. Sponge stick was used to gentle push water bag into cervix, stitch visualized and cut with long scissors. Stitch removed, bag of water intact. Patient tolerated well.

## 2019-04-27 NOTE — PROGRESS NOTES
Called for increasing discomfort and contractions on toco. Patient reports around 2029-1684 she started to become more uncomfortable, feeling contractions every 5-8 miuntes.  On exam, tense BBOW felt, can palpate cervix on right side (stitch felt) but unabl

## 2019-04-28 RX ORDER — IBUPROFEN 600 MG/1
600 TABLET ORAL EVERY 6 HOURS SCHEDULED
Status: DISCONTINUED | OUTPATIENT
Start: 2019-04-28 | End: 2019-05-01

## 2019-04-28 NOTE — PROGRESS NOTES
[facility]    Patients Name: Lovely Johnston  Attending Physician: Samia Wallace MD  CSN: [id number]    Location:  2197/2197-A  MRN: [id number]    YOB: 1985  Admission Date: 4/19/2019     Obstetric Anesthesia Pain Progress Note

## 2019-04-28 NOTE — PROGRESS NOTES
[facility]  Post-Partum Caesarean Section Progress Note    Dwaine Lovelllavon Patient Status:  Inpatient    1985 MRN [id number]   [facility] 2SW-J Attending Chau Mendoza MD   Hosp Day # 9 PCP No primary care provider on f care  Infant in NICU and stable at present time .     Cain Phelps  4/28/2019  11:58 AM

## 2019-04-29 RX ORDER — CHOLECALCIFEROL (VITAMIN D3) 25 MCG
1 TABLET,CHEWABLE ORAL DAILY
Status: DISCONTINUED | OUTPATIENT
Start: 2019-04-29 | End: 2019-05-01

## 2019-04-29 NOTE — PROGRESS NOTES
[facility]  Post-Partum Caesarean Section Progress Note    Dwaine Suazo Patient Status:  Inpatient    1985 MRN [id number]   [facility] 2SW-J Attending Deondre Martinez MD   Hosp Day # 10 PCP No primary care provider on

## 2019-04-29 NOTE — CM/SW NOTE
CM met with patient, Mrs Estrella Sanchez and her  to review insurance and PCP for infant. Infant will be added to Mr Elvira Shepherd PPO plan. CM reviewed with couple that they have 30 days to get infant added to insurance planned ask that they call insurance.  [facility]

## 2019-04-29 NOTE — BH PROGRESS NOTE
CARLY PPD SCREENING    Reason for Referral: This patient was referred for further behavioral health assessment due to EPDS score of 10, no thoughts of self-harm.     Current Stressors:    History of PPD: The patient attributes symptoms of anxiety to complicat plans to stay for 5-6 months. Plan:    Provide PPD Information:     Postpartum Depression Resources Given: Yes, discussed    Cradle [employer] Information Given: Not at this time. Patient's baby will continue hospitalization in the upcoming months.     Agustina Aguilar

## 2019-04-29 NOTE — CM/SW NOTE
SW attempted to meet with pt and her spouse due to their baby boy being admitted to the NICU. Pt known to SW due to pt's antepartum admission. Pt unable to meet with SW, however SW spoke with pt's spouse.  Pt's spouse states they are doing well, however

## 2019-04-30 PROBLEM — O34.32 INCOMPETENT CERVIX DURING SECOND TRIMESTER, ANTEPARTUM (HCC): Status: RESOLVED | Noted: 2019-04-19 | Resolved: 2019-04-30

## 2019-04-30 PROBLEM — O34.32 CERVICAL INCOMPETENCE DURING PREGNANCY IN SECOND TRIMESTER: Status: RESOLVED | Noted: 2019-03-28 | Resolved: 2019-04-30

## 2019-04-30 PROBLEM — O09.529 AMA (ADVANCED MATERNAL AGE) MULTIGRAVIDA 35+ (HCC): Status: RESOLVED | Noted: 2019-03-28 | Resolved: 2019-04-30

## 2019-04-30 PROBLEM — O34.32 INCOMPETENT CERVIX DURING SECOND TRIMESTER, ANTEPARTUM: Status: RESOLVED | Noted: 2019-04-19 | Resolved: 2019-04-30

## 2019-04-30 PROBLEM — Z34.90 PREGNANCY (HCC): Status: RESOLVED | Noted: 2019-03-28 | Resolved: 2019-04-30

## 2019-04-30 PROBLEM — Z34.90 PREGNANCY: Status: RESOLVED | Noted: 2019-03-28 | Resolved: 2019-04-30

## 2019-04-30 PROBLEM — O34.32: Status: RESOLVED | Noted: 2019-03-28 | Resolved: 2019-04-30

## 2019-04-30 PROBLEM — O09.529 AMA (ADVANCED MATERNAL AGE) MULTIGRAVIDA 35+: Status: RESOLVED | Noted: 2019-03-28 | Resolved: 2019-04-30

## 2019-04-30 RX ORDER — IBUPROFEN 600 MG/1
600 TABLET ORAL EVERY 6 HOURS PRN
Qty: 60 TABLET | Refills: 0 | Status: SHIPPED | OUTPATIENT
Start: 2019-04-30 | End: 2019-06-07 | Stop reason: ALTCHOICE

## 2019-04-30 RX ORDER — HYDROCODONE BITARTRATE AND ACETAMINOPHEN 5; 325 MG/1; MG/1
1-2 TABLET ORAL EVERY 4 HOURS PRN
Qty: 30 TABLET | Refills: 0 | Status: SHIPPED | OUTPATIENT
Start: 2019-04-30 | End: 2019-05-10 | Stop reason: ALTCHOICE

## 2019-04-30 NOTE — CM/SW NOTE
SW provided parent NICU packet of resources for Mandeep Cardoso family room and sleep room area, Antepartum/NICU Facebook page, support groups, and written signs and symptoms of Postpartum Depression/anxiety with [facility] resources for psychiatrist and Maximo Lesch

## 2019-04-30 NOTE — PLAN OF CARE
Problem: POSTPARTUM  Goal: Long Term Goal:Experiences normal postpartum course  Description  INTERVENTIONS:  - Assess and monitor vital signs and lab values. - Assess fundus and lochia. - Provide ice/sitz baths for perineum discomfort.   - Monitor heali efforts. - Assess support systems available to mother/family.  - Identify cultural beliefs/practices regarding lactation, letdown techniques, maternal food preferences.   - Assess mother's knowledge and previous experience with breast feeding.  - Provide i

## 2019-04-30 NOTE — PROGRESS NOTES
OB Progress Note POD#3  S: She is feeling well. Ambulating. Pain controlled. Minimal VB. Eating, passing gas. Visiting baby in NICU. O:  Blood pressure 108/64, pulse 78, temperature 97.9 °F (36.6 °C), temperature source Oral, resp.  rate 18, height 5' 4\

## 2019-05-01 VITALS
SYSTOLIC BLOOD PRESSURE: 121 MMHG | BODY MASS INDEX: 28.12 KG/M2 | HEIGHT: 60.51 IN | TEMPERATURE: 98 F | DIASTOLIC BLOOD PRESSURE: 85 MMHG | RESPIRATION RATE: 16 BRPM | OXYGEN SATURATION: 98 % | WEIGHT: 147 LBS | HEART RATE: 64 BPM

## 2019-05-01 NOTE — PROGRESS NOTES
Spoke with Dr. Deng Drake to clarify if pt needs another rhogam dose after delivery. Pt rec'd a dose on 4/19. \"Rhogam is good for 30 days\" per Dr. Deng Drake so another dose is not needed. D/C order rec'd per phone per Dr. Deng Drake.

## 2019-05-01 NOTE — PROGRESS NOTES
REVIEWED D/C TEACHING WITH PT. VERBALIZES UNDERSTANDING. ALL QUESTIONS ANSWERED. PT STATES FEELS CONFIDENT CARING FOR SELF AT HOME. MOM DISCHARGED TO HOME IN STABLE CONDITION.  WILL FOLLOW-UP IN OFFICE AS DIRECTED WITH OB.

## 2019-05-02 NOTE — PAYOR COMM NOTE
--------------  DISCHARGE REVIEW    Payor: [facility]  Subscriber #:  [id number]  Authorization Number: [id number]    Admit date: 4/19/19  Admit time:  1059  Discharge Date: 5/1/2019  1:15 PM     Admitting Physician: Dayron Raygoza MD  [place]

## 2019-05-06 ENCOUNTER — TELEPHONE (OUTPATIENT)
Dept: OBGYN UNIT | Facility: HOSPITAL | Age: 34
End: 2019-05-06

## 2019-05-06 NOTE — PROGRESS NOTES
Reviewed self care w / mom, she verbalizes understanding of instructions reviewed. Encourage to follow up w/ MDs as directed and w/ questions/concerns. Infant remains in nicu, visits freq, but not daily. EPDS = 10, occas crying but not ppd, care reviewed.

## 2019-05-20 NOTE — DISCHARGE SUMMARY
Pt  Without complaints - tolerating PO well  /85 (BP Location: Right arm)   Pulse 64   Temp 98.1 °F (36.7 °C) (Oral)   Resp 16   Ht 5' 0.51\" (1.537 m)   Wt 147 lb (66.7 kg)   LMP 11/05/2018 (Exact Date)   SpO2 98%   Breastfeeding?  Yes   BMI 28.23 kg

## 2020-04-21 NOTE — PROGRESS NOTES
Call to pt's cell for COVID PUI follow up reaches identified voice mail. Left vmm req call back to triage nurse today to provide condition update. Provided ofc phone hours. Patient comfortable with epidural but very sleepy. No pain or pressure. Repeat cervical exam, likely 6cm/100% with BBOW. Will continue magnesium and epidural. Repeat BMTZ done. Continue close observation.  All questions answered at this time    Felipe Ortiz

## 2021-01-22 NOTE — H&P
[facility]    History & Physical    Dwaine Castorena Mean Patient Status:  Inpatient    1985 MRN [id number]   Location [address] [facility] Attending Clearance MD Jeremi   Hosp Day # 0 PCP No primary care provider on file.      Frank Alcohol use: No      Home Meds:   Medications Prior to Admission:  Progesterone Micronized 200 MG Oral Cap Place 1 capsule (200 mg total) vaginally nightly.  Disp: 30 capsule Rfl: 0 4/18/2019 at Unknown time   Prenatal Vit-Fe Fumarate-FA (PRENATAL COMPLETE answered, all appropriate consents will be signed at the Hospital. Admission is planned for today. Continue present management.     FRANCESCA RAMIREZ  4/19/2019  11:36 AM yes

## 2022-02-14 ENCOUNTER — OFFICE VISIT (OUTPATIENT)
Dept: HEMATOLOGY/ONCOLOGY | Facility: HOSPITAL | Age: 37
End: 2022-02-14
Attending: SPECIALIST
Payer: COMMERCIAL

## 2022-02-14 VITALS
HEART RATE: 74 BPM | SYSTOLIC BLOOD PRESSURE: 130 MMHG | RESPIRATION RATE: 16 BRPM | HEIGHT: 60.98 IN | TEMPERATURE: 97 F | WEIGHT: 132 LBS | OXYGEN SATURATION: 100 % | BODY MASS INDEX: 24.92 KG/M2 | DIASTOLIC BLOOD PRESSURE: 85 MMHG

## 2022-02-14 DIAGNOSIS — R22.2 ABDOMINAL WALL MASS: Primary | ICD-10-CM

## 2022-02-14 DIAGNOSIS — R59.0 INTRA-ABDOMINAL LYMPHADENOPATHY: ICD-10-CM

## 2022-02-14 PROCEDURE — 99245 OFF/OP CONSLTJ NEW/EST HI 55: CPT | Performed by: SPECIALIST

## 2022-02-14 RX ORDER — MAG HYDROX/ALUMINUM HYD/SIMETH 400-400-40
5000 SUSPENSION, ORAL (FINAL DOSE FORM) ORAL DAILY
COMMUNITY

## 2022-02-14 NOTE — PROGRESS NOTES
Patient is here today for Consult with Dinah Barclay. Patient denies pain. Medication list and medical history were reviewed and updated. Education Record    Learner:  Patient    Disease / Diagnosis: Consult    Barriers / Limitations:  None   Comments:    Method:  Brief focused, Discussion, Printed material and Reinforcement   Comments:    General Topics:  Medication, Pain, Procedure and Plan of care reviewed   Comments:    Outcome:  Shows understanding   Comments:    AVS provided and follow up reviewed. Patient instructed to call as needed.

## 2022-02-16 ENCOUNTER — HOSPITAL ENCOUNTER (OUTPATIENT)
Dept: MRI IMAGING | Facility: HOSPITAL | Age: 37
Discharge: HOME | End: 2022-02-16
Attending: SPECIALIST
Payer: COMMERCIAL

## 2022-02-16 DIAGNOSIS — R22.2 ABDOMINAL WALL MASS: ICD-10-CM

## 2022-02-16 PROCEDURE — 72197 MRI PELVIS W/O & W/DYE: CPT | Performed by: SPECIALIST

## 2022-02-16 PROCEDURE — 74183 MRI ABD W/O CNTR FLWD CNTR: CPT | Performed by: SPECIALIST

## 2022-02-16 PROCEDURE — A9575 INJ GADOTERATE MEGLUMI 0.1ML: HCPCS | Performed by: SPECIALIST

## 2022-03-01 ENCOUNTER — DOCUMENTATION ONLY (OUTPATIENT)
Dept: HEMATOLOGY/ONCOLOGY | Facility: HOSPITAL | Age: 37
End: 2022-03-01

## 2022-03-01 NOTE — PROGRESS NOTES
[facility] Consultation Note      Patient Name: Beatriz Naylor   YOB: 1985  Medical Record Number: [id number]    MRI findings are not inconsistent with a diagnosis of endometrioma. Patient plans to pursue surgical resection. With regard to the lymph nodes in the right pelvis, these are ultimately non-specific. Patient will discuss possible colonoscopy with her GI physician. I recommend repeating CT scan in approximately 6 months as follow up. This should be done through her primary care physician. Electronically signed by:    Trina Douglas M.D.   Associate Medical Director of Oncology [facility], [place], [place]

## 2024-04-19 ENCOUNTER — OFFICE VISIT (OUTPATIENT)
Dept: FAMILY MEDICINE CLINIC | Facility: CLINIC | Age: 39
End: 2024-04-19
Payer: COMMERCIAL

## 2024-04-19 VITALS
WEIGHT: 142 LBS | TEMPERATURE: 98 F | HEIGHT: 61 IN | HEART RATE: 80 BPM | DIASTOLIC BLOOD PRESSURE: 82 MMHG | SYSTOLIC BLOOD PRESSURE: 110 MMHG | RESPIRATION RATE: 16 BRPM | BODY MASS INDEX: 26.81 KG/M2 | OXYGEN SATURATION: 99 %

## 2024-04-19 DIAGNOSIS — R53.83 FATIGUE, UNSPECIFIED TYPE: ICD-10-CM

## 2024-04-19 DIAGNOSIS — R00.2 PALPITATIONS: Primary | ICD-10-CM

## 2024-04-19 DIAGNOSIS — R06.02 SHORTNESS OF BREATH: ICD-10-CM

## 2024-04-19 DIAGNOSIS — F41.1 GAD (GENERALIZED ANXIETY DISORDER): ICD-10-CM

## 2024-04-19 DIAGNOSIS — F33.0 MILD EPISODE OF RECURRENT MAJOR DEPRESSIVE DISORDER (HCC): ICD-10-CM

## 2024-04-19 DIAGNOSIS — Z00.00 LABORATORY EXAM ORDERED AS PART OF ROUTINE GENERAL MEDICAL EXAMINATION: ICD-10-CM

## 2024-04-19 PROCEDURE — 99204 OFFICE O/P NEW MOD 45 MIN: CPT | Performed by: FAMILY MEDICINE

## 2024-04-19 PROCEDURE — 3079F DIAST BP 80-89 MM HG: CPT | Performed by: FAMILY MEDICINE

## 2024-04-19 PROCEDURE — 3008F BODY MASS INDEX DOCD: CPT | Performed by: FAMILY MEDICINE

## 2024-04-19 PROCEDURE — 93000 ELECTROCARDIOGRAM COMPLETE: CPT | Performed by: FAMILY MEDICINE

## 2024-04-19 PROCEDURE — 96127 BRIEF EMOTIONAL/BEHAV ASSMT: CPT | Performed by: FAMILY MEDICINE

## 2024-04-19 PROCEDURE — 3074F SYST BP LT 130 MM HG: CPT | Performed by: FAMILY MEDICINE

## 2024-04-19 NOTE — PROGRESS NOTES
Family Medicine Progress Note  Assessment & Plan:   Dwaine Lee is a 38 year old female who is here for:     1. Palpitations- C/o Palpitations,  No known CAD, EKG in clinic without concerning findings; QTc appropriate.  Not on RX therapy that would be prompting.  Denies stress/anxiety.  Need to ensure no underlying Anemia, Thyroid Disease, or electrolyte abnormality. No illicit Drug Use. No concern for Preg  - EKG in clinic.,   - CBC, CMP, TSH     - EKG with interpretation and Report -IN OFFICE [[id number]]  - CBC W Differential W Platelet [E]; Future  - Comp Metabolic Panel (14) [E]; Future  - TSH W Reflex To Free T4 [E]; Future  - CARD MONITOR HOLTER ZIO 3-7 DAYS (CPT=93242/08112); Future    2. Shortness of breath- as above.   - EKG with interpretation and Report -IN OFFICE [[id number]]    3. Fatigue, unspecified type  Must r/o thyroid, liver, kidney and metabolic diseases as well as new-onset diabetes and anemia as source of fatigue. Exam without any prominent abnormalities.  .  Stress and anxiety are possible etiologies of the patient's fatigue given the patient's history of present illness.   - Labs: CMP, HbA1c, CBC, TSH, Vitamin D.   - Patient was educated regarding the importance of exercise, stress/anxiety reduction, moderating caffeine intake, proper sleep hygiene for getting healthy and adequate amounts of rest,   - Vitamin B12 [E]; Future  - Vitamin D [E]; Future    4. Laboratory exam ordered as part of routine general medical examination  - Lipid Panel [E]; Future     5. MDD/DYLAN- managed by Psych;  Discussed Augmenting Wellbutirn with Lexapro.  Plans to discuss with Psych.       Follow-Up: Return for pending work-up.      Kim Cade DO   04/19/24      CC: Establish Care and Other (Fatigue and lightheaded, low hearing on Rt ear , shortness of breath and heart palpitations.)    Subjective:    History of Present Illness:  History obtained from patient.     Dwaine Lee is a 38 year old female  who presents for Establish Care and Other (Fatigue and lightheaded, low hearing on Rt ear , shortness of breath and heart palpitations.)       For the past month or so has been feeling shortness of breath just with talking and minimal exercise/exertion. Also with occ heart palpitations. Feels generally off.    No recent colds; fearful that something is wrong with her heart.    .   Diet- unhealthy--not much cooking. Eating out. Could be better with F/V.  Heavy on carbs. MVN, inconsistent use.    Exercise-None   MH- \"feels confused\";  emotionally trigger, recent conflict with spouse;   quick to tears/\"breakdown\".      H/O GDM   Migraine  MDD/DYLAN- Wellbutrin 150mg -- Currently Psych and would prefer continued mgmt.   Pshx: CS x2, ?Removal Abd Mass  All: {NKDA  Fam hx: TB-B; Parkinsons-F   Soc hx: , 2 children.   Ob/gyne: Patient's last menstrual period was 2024.. last pap: 2021, last mammogram: -,  ,   Colonoscopy: -       History/Other:   ROS-Per HPI     Problem List:  Patient Active Problem List   Diagnosis    Rh negative, antepartum (HCC)    Mild episode of recurrent major depressive disorder (HCC)    DYLAN (generalized anxiety disorder)       Current Medications:  Current Outpatient Medications   Medication Sig Dispense Refill    buPROPion ER (WELLBUTRIN XL) 150 MG Oral Tablet 24 Hr Take 1 tablet (150 mg total) by mouth daily. 30 tablet 2    Multiple Vitamin (MULTIVITAMIN ADULT) Oral Tab       Vitamin D3, Cholecalciferol, (VITAMIN D3) 125 MCG (5000 UT) Oral Cap Take 5,000 Units by mouth daily. (Patient not taking: Reported on 2024)        Past Medical History:  Past Medical History:    Anxiety    I guess a lot of it    Depression    maybe, jimmie    Gestational diabetes (HCC)    last pregnancy    Hyperlipidemia    Migraines    Obesity    Maybe, jimmie, plz check    Previous  delivery affecting pregnancy, delivered (HCC)    Typhoid    was in [place]      Past Surgical History:  Past  Surgical History:   Procedure Laterality Date          non progressive labor- not our office      2019     delivery only  2019    Ines Doran DO    Other surgical history  2019    cervical circlage    Other surgical history  2022    Excision abdominal wall mass       Family History:  Family History   Problem Relation Age of Onset    No Known Problems Mother     Hypertension Father     Other (Other) Father         parkinsens     Other (TB spine) Brother         recd complete rx      Social History:  Social History     Socioeconomic History    Marital status:    Tobacco Use    Smoking status: Never    Smokeless tobacco: Never   Vaping Use    Vaping status: Never Used   Substance and Sexual Activity    Alcohol use: Yes     Comment: once or twice a year :)    Drug use: Never    Sexual activity: Yes     Partners: Male   Other Topics Concern    Exercise Yes    Special Diet Yes    Stress Concern Yes    Weight Concern Yes     Social Determinants of Health     Financial Resource Strain: Low Risk  (2022)    Received from [facility]    Overall Financial Resource Strain (CARDIA)     Difficulty of Paying Living Expenses: Not hard at all   Food Insecurity: No Food Insecurity (2022)    Received from [facility]    Hunger Vital Sign     Worried About Running Out of Food in the Last Year: Never true     Ran Out of Food in the Last Year: Never true   Transportation Needs: No Transportation Needs (2022)    Received from [facility]    PRAPARE - Transportation     Lack of Transportation (Medical): No     Lack of Transportation (Non-Medical): No   Physical Activity: Inactive (2022)    Exercise Vital Sign     Days of Exercise per Week: 0 days     Minutes of Exercise per Session: 0 min   Stress: Stress Concern Present (2022)    Received from [facility]    [employer] [place] [employer] - Occupational Stress Questionnaire      Feeling of Stress : To some extent   Social Connections: Moderately Isolated (2/7/2022)    Received from [facility]    Social Connection and Isolation Panel [NHANES]     Frequency of Communication with Friends and Family: More than three times a week     Frequency of Social Gatherings with Friends and Family: Once a week     Attends Alevism Services: Never     Active Member of Clubs or Organizations: No     Attends Club or Organization Meetings: Never     Marital Status:    Housing Stability: Low Risk  (2/7/2022)    Received from [facility]    Housing Stability Vital Sign     Unable to Pay for Housing in the Last Year: No     Number of Places Lived in the Last Year: 1     In the last 12 months, was there a time when you did not have a steady place to sleep or slept in a shelter (including now)?: No       Allergies:  No Known Allergies     Objective:    VITALS: /82   Pulse 80   Temp 98.1 °F (36.7 °C) (Temporal)   Resp 16   Ht 5' 1\" (1.549 m)   Wt 142 lb (64.4 kg)   LMP 04/18/2024   SpO2 99%   BMI 26.83 kg/m²      BP Readings from Last 3 Encounters:   04/19/24 110/82   03/03/22 110/75   02/14/22 130/85     Wt Readings from Last 3 Encounters:   04/19/24 142 lb (64.4 kg)   03/03/22 127 lb (57.6 kg)   02/14/22 132 lb (59.9 kg)     PHYSICAL EXAM  GEN: pleasant, well-appearing in NAD, AOX3  SKIN: no visible rashes, lesions, or evidence of trauma  HEENT: PERRL, EOMI, moist mucous membranes, oropharynx clear, TM clear, nares patent, no Thyromegaly or nodule.   CV: RRR, no murmurs or abnl heart sounds   PULM: Clear to auscultation, No wheezes, rales, rhonchi.  Non-labored breathing.  ABD: Soft, non-tender, non-distended, + BS, no rigidity/guarding  EXT:  Warm, well perfused, no lower extremity edema  NEURO: CNs grossly intact, no focal weakness  MSK: moves all 4 extremities without difficulty  PSYCH: mood and affect are appropriate         Approximately 50 minutes was spent:  preparing to see the patient (reviewing prior tests, office notes, and consultant notes), personally obtaining a history, conducting a physical exam, counseling the patient on the plan of care, entering appropriate orders, and documenting clinical information in the electronic health record.          Kim Cade, DO    NOTE TO PATIENT: The 21st Century Cures Act makes clinical notes like these available to patients in the interest of transparency. Clinical notes are medical documents used by physicians and care providers to communicate with each other. These documents include medical language and terminology, abbreviations, and treatment information that may sound technical and at times possibly unfamiliar. In addition, at times, the verbiage may appear blunt or direct. These documents are one tool providers use to communicate relevant information and clinical opinions of the care providers in a way that allows common understanding of the clinical context.

## 2024-04-19 NOTE — PATIENT INSTRUCTIONS
Labs   Holter Monitor.   Look into Lexapro   Follow-up in the next 1 month or so.  Sooner if needed   Spinach Salad three times a week   Girl wash your Face by Miranda Andrade   Last Annual was 6/23/23        Treating Anxiety Disorders with Therapy  If you have an anxiety disorder, you should know it can be treated. Therapy (also called counseling) is often a helpful treatment for anxiety disorders. With therapy, a trained therapist helps you face and learn to manage your anxiety. Therapy can be short-term or long-term. It is based on your needs. In some cases, medicine may also be prescribed with therapy. It may take time before you notice how much therapy is helping. But stick with it. With therapy, you can feel better.  Cognitive behavioral therapy (CBT)  Cognitive behavioral therapy (CBT) teaches you to manage anxiety. It does this by helping you understand how you think and act when you’re anxious. Research has shown CBT to work very well for anxiety disorders. CBT includes homework and activities. These build your skills to cope with anxiety step by step. CBT can be done in a group or one-on-one. It often takes place for a set number of sessions. CBT has two main parts:  Cognitive therapy. This helps you identify the negative, irrational thoughts that occur with your anxiety. You’ll learn to replace these with more positive, realistic thoughts.  Behavioral therapy. This helps you change how you react to anxiety. You’ll learn coping skills and methods for relaxing to help you better deal with anxiety.    MENTAL HEALTH APPS-   [employer] CBT- Free Anxiety Focused--each you how to harness CBT to lower anxiety levels. It allows you to challenge the personal beliefs that may be holding you back from more peaceful living, and gives you clear tools to improve your overall wellbeing.    [employer] $5.00- teaches concepts of Cognitive Behavioral Therapy    CBT Thought Diary- record and identify maladaptive thoughts.      HeadSpace- Great for Meditation can alleviate heightened anxiety, and Headspace aims to take the guesswork out of learning how to meditate, and will help you with a regular meditation practice.    Happify- Happify is geared toward helping people lower stress and manage anxiety--all by helping you let go of habits that aren’t working for you and creating new ones

## 2024-04-22 PROBLEM — F41.1 GAD (GENERALIZED ANXIETY DISORDER): Status: ACTIVE | Noted: 2024-04-22

## 2024-04-22 PROBLEM — F33.0 MILD EPISODE OF RECURRENT MAJOR DEPRESSIVE DISORDER (HCC): Status: ACTIVE | Noted: 2024-04-22

## 2024-04-22 PROBLEM — F33.0 MILD EPISODE OF RECURRENT MAJOR DEPRESSIVE DISORDER: Status: ACTIVE | Noted: 2024-04-22

## 2024-04-23 LAB
ATRIAL RATE: 60 BPM
P AXIS: 55 DEGREES
P-R INTERVAL: 130 MS
Q-T INTERVAL: 398 MS
QRS DURATION: 92 MS
QTC CALCULATION (BEZET): 398 MS
R AXIS: 66 DEGREES
T AXIS: 41 DEGREES
VENTRICULAR RATE: 60 BPM

## 2024-06-14 ENCOUNTER — NURSE TRIAGE (OUTPATIENT)
Dept: INTERNAL MEDICINE CLINIC | Facility: CLINIC | Age: 39
End: 2024-06-14

## 2024-06-14 NOTE — TELEPHONE ENCOUNTER
Action Requested: Summary for Provider     []  Critical Lab, Recommendations Needed  [] Need Additional Advice  []   FYI    []   Need Orders  [] Need Medications Sent to Pharmacy  []  Other     SUMMARY: Patient reports she flew back from [place] last Friday, felt fine over weekend, Monday developed body aches.  She denies any other sick symptoms, no HA, sore throat, fever, sinus congestion.  She had her period last week which was normal, not heavy.  She is sleeping ok, eating and drinking normally.  Patient has appointment on 6/26, needs 40 min appt per Dr Cade.  Advised patient to get current labs drawn now which will give us more information.  Once we get those results, we will be in touch with further recommendations.      Reason for call: Body ache and/or chills  Onset: 5 days    Patient taking ibuprofen 1-2 x a day, which helps with body aches.  Advised patient to continue, push fluids, healthy eating, rest.     Reason for Disposition   MILD pain (e.g., does not interfere with normal activities) and present > 7 days    Protocols used: Muscle Aches and Body Pain-A-[place]

## 2024-06-15 ENCOUNTER — LAB ENCOUNTER (OUTPATIENT)
Dept: LAB | Facility: HOSPITAL | Age: 39
End: 2024-06-15
Attending: FAMILY MEDICINE

## 2024-06-15 DIAGNOSIS — R00.2 PALPITATIONS: ICD-10-CM

## 2024-06-15 DIAGNOSIS — R53.83 FATIGUE, UNSPECIFIED TYPE: ICD-10-CM

## 2024-06-15 DIAGNOSIS — Z00.00 LABORATORY EXAM ORDERED AS PART OF ROUTINE GENERAL MEDICAL EXAMINATION: ICD-10-CM

## 2024-06-15 LAB
ALBUMIN SERPL-MCNC: 3.5 G/DL (ref 3.4–5)
ALBUMIN/GLOB SERPL: 1 {RATIO} (ref 1–2)
ALP LIVER SERPL-CCNC: 105 U/L
ALT SERPL-CCNC: 187 U/L
ANION GAP SERPL CALC-SCNC: 5 MMOL/L (ref 0–18)
AST SERPL-CCNC: 71 U/L (ref 15–37)
BASOPHILS # BLD AUTO: 0.03 X10(3) UL (ref 0–0.2)
BASOPHILS NFR BLD AUTO: 0.5 %
BILIRUB SERPL-MCNC: 0.3 MG/DL (ref 0.1–2)
BUN BLD-MCNC: 7 MG/DL (ref 9–23)
CALCIUM BLD-MCNC: 8.6 MG/DL (ref 8.5–10.1)
CHLORIDE SERPL-SCNC: 111 MMOL/L (ref 98–112)
CHOLEST SERPL-MCNC: 189 MG/DL (ref ?–200)
CO2 SERPL-SCNC: 25 MMOL/L (ref 21–32)
CREAT BLD-MCNC: 0.79 MG/DL
EGFRCR SERPLBLD CKD-EPI 2021: 97 ML/MIN/1.73M2 (ref 60–?)
EOSINOPHIL # BLD AUTO: 0.16 X10(3) UL (ref 0–0.7)
EOSINOPHIL NFR BLD AUTO: 2.9 %
ERYTHROCYTE [DISTWIDTH] IN BLOOD BY AUTOMATED COUNT: 12.6 %
FASTING PATIENT LIPID ANSWER: YES
FASTING STATUS PATIENT QL REPORTED: YES
GLOBULIN PLAS-MCNC: 3.6 G/DL (ref 2.8–4.4)
GLUCOSE BLD-MCNC: 93 MG/DL (ref 70–99)
HCT VFR BLD AUTO: 37.7 %
HDLC SERPL-MCNC: 59 MG/DL (ref 40–59)
HGB BLD-MCNC: 13.5 G/DL
IMM GRANULOCYTES # BLD AUTO: 0.01 X10(3) UL (ref 0–1)
IMM GRANULOCYTES NFR BLD: 0.2 %
LDLC SERPL CALC-MCNC: 102 MG/DL (ref ?–100)
LYMPHOCYTES # BLD AUTO: 1.97 X10(3) UL (ref 1–4)
LYMPHOCYTES NFR BLD AUTO: 35.8 %
MCH RBC QN AUTO: 29.5 PG (ref 26–34)
MCHC RBC AUTO-ENTMCNC: 35.8 G/DL (ref 31–37)
MCV RBC AUTO: 82.3 FL
MONOCYTES # BLD AUTO: 0.57 X10(3) UL (ref 0.1–1)
MONOCYTES NFR BLD AUTO: 10.3 %
NEUTROPHILS # BLD AUTO: 2.77 X10 (3) UL (ref 1.5–7.7)
NEUTROPHILS # BLD AUTO: 2.77 X10(3) UL (ref 1.5–7.7)
NEUTROPHILS NFR BLD AUTO: 50.3 %
NONHDLC SERPL-MCNC: 130 MG/DL (ref ?–130)
OSMOLALITY SERPL CALC.SUM OF ELEC: 290 MOSM/KG (ref 275–295)
PLATELET # BLD AUTO: 192 10(3)UL (ref 150–450)
POTASSIUM SERPL-SCNC: 4.3 MMOL/L (ref 3.5–5.1)
PROT SERPL-MCNC: 7.1 G/DL (ref 6.4–8.2)
RBC # BLD AUTO: 4.58 X10(6)UL
SODIUM SERPL-SCNC: 141 MMOL/L (ref 136–145)
TRIGL SERPL-MCNC: 165 MG/DL (ref 30–149)
TSI SER-ACNC: 1.24 MIU/ML (ref 0.36–3.74)
VIT B12 SERPL-MCNC: 360 PG/ML (ref 193–986)
VIT D+METAB SERPL-MCNC: 30.2 NG/ML (ref 30–100)
VLDLC SERPL CALC-MCNC: 28 MG/DL (ref 0–30)
WBC # BLD AUTO: 5.5 X10(3) UL (ref 4–11)

## 2024-06-15 PROCEDURE — 82607 VITAMIN B-12: CPT

## 2024-06-15 PROCEDURE — 82306 VITAMIN D 25 HYDROXY: CPT

## 2024-06-15 PROCEDURE — 85025 COMPLETE CBC W/AUTO DIFF WBC: CPT

## 2024-06-15 PROCEDURE — 36415 COLL VENOUS BLD VENIPUNCTURE: CPT

## 2024-06-15 PROCEDURE — 84443 ASSAY THYROID STIM HORMONE: CPT

## 2024-06-15 PROCEDURE — 80053 COMPREHEN METABOLIC PANEL: CPT

## 2024-06-15 PROCEDURE — 80061 LIPID PANEL: CPT

## 2024-06-21 ENCOUNTER — OFFICE VISIT (OUTPATIENT)
Dept: FAMILY MEDICINE CLINIC | Facility: CLINIC | Age: 39
End: 2024-06-21

## 2024-06-21 ENCOUNTER — LAB ENCOUNTER (OUTPATIENT)
Dept: LAB | Age: 39
End: 2024-06-21
Attending: FAMILY MEDICINE

## 2024-06-21 ENCOUNTER — TELEPHONE (OUTPATIENT)
Dept: FAMILY MEDICINE CLINIC | Facility: CLINIC | Age: 39
End: 2024-06-21

## 2024-06-21 VITALS
HEART RATE: 82 BPM | OXYGEN SATURATION: 99 % | TEMPERATURE: 98 F | RESPIRATION RATE: 16 BRPM | HEIGHT: 61 IN | BODY MASS INDEX: 26.81 KG/M2 | DIASTOLIC BLOOD PRESSURE: 72 MMHG | WEIGHT: 142 LBS | SYSTOLIC BLOOD PRESSURE: 100 MMHG

## 2024-06-21 DIAGNOSIS — R74.8 ALKALINE PHOSPHATASE ELEVATION: Primary | ICD-10-CM

## 2024-06-21 DIAGNOSIS — M79.10 MYALGIA: ICD-10-CM

## 2024-06-21 DIAGNOSIS — M25.50 POLYARTHRALGIA: ICD-10-CM

## 2024-06-21 DIAGNOSIS — R74.8 ELEVATED LIVER ENZYMES: ICD-10-CM

## 2024-06-21 DIAGNOSIS — R74.8 ALKALINE PHOSPHATASE ELEVATION: ICD-10-CM

## 2024-06-21 LAB
ALBUMIN SERPL-MCNC: 3.9 G/DL (ref 3.4–5)
ALBUMIN/GLOB SERPL: 1 {RATIO} (ref 1–2)
ALP LIVER SERPL-CCNC: 130 U/L
ALT SERPL-CCNC: 194 U/L
ANION GAP SERPL CALC-SCNC: 4 MMOL/L (ref 0–18)
AST SERPL-CCNC: 65 U/L (ref 15–37)
BILIRUB SERPL-MCNC: 0.3 MG/DL (ref 0.1–2)
BUN BLD-MCNC: 9 MG/DL (ref 9–23)
CALCIUM BLD-MCNC: 9 MG/DL (ref 8.5–10.1)
CHLORIDE SERPL-SCNC: 108 MMOL/L (ref 98–112)
CK SERPL-CCNC: 44 U/L
CO2 SERPL-SCNC: 24 MMOL/L (ref 21–32)
CREAT BLD-MCNC: 0.85 MG/DL
CRP SERPL-MCNC: 0.88 MG/DL (ref ?–0.3)
EGFRCR SERPLBLD CKD-EPI 2021: 89 ML/MIN/1.73M2 (ref 60–?)
ERYTHROCYTE [SEDIMENTATION RATE] IN BLOOD: 19 MM/HR
FASTING STATUS PATIENT QL REPORTED: NO
GGT SERPL-CCNC: 60 U/L
GLOBULIN PLAS-MCNC: 3.9 G/DL (ref 2.8–4.4)
GLUCOSE BLD-MCNC: 87 MG/DL (ref 70–99)
LIPASE SERPL-CCNC: 26 U/L (ref ?–300)
OSMOLALITY SERPL CALC.SUM OF ELEC: 280 MOSM/KG (ref 275–295)
POTASSIUM SERPL-SCNC: 4.3 MMOL/L (ref 3.5–5.1)
PROT SERPL-MCNC: 7.8 G/DL (ref 6.4–8.2)
SODIUM SERPL-SCNC: 136 MMOL/L (ref 136–145)

## 2024-06-21 PROCEDURE — 86140 C-REACTIVE PROTEIN: CPT | Performed by: FAMILY MEDICINE

## 2024-06-21 PROCEDURE — 82977 ASSAY OF GGT: CPT | Performed by: FAMILY MEDICINE

## 2024-06-21 PROCEDURE — 83690 ASSAY OF LIPASE: CPT | Performed by: FAMILY MEDICINE

## 2024-06-21 PROCEDURE — 3078F DIAST BP <80 MM HG: CPT | Performed by: FAMILY MEDICINE

## 2024-06-21 PROCEDURE — 3074F SYST BP LT 130 MM HG: CPT | Performed by: FAMILY MEDICINE

## 2024-06-21 PROCEDURE — 86038 ANTINUCLEAR ANTIBODIES: CPT | Performed by: FAMILY MEDICINE

## 2024-06-21 PROCEDURE — 99214 OFFICE O/P EST MOD 30 MIN: CPT | Performed by: FAMILY MEDICINE

## 2024-06-21 PROCEDURE — 80053 COMPREHEN METABOLIC PANEL: CPT | Performed by: FAMILY MEDICINE

## 2024-06-21 PROCEDURE — 86225 DNA ANTIBODY NATIVE: CPT | Performed by: FAMILY MEDICINE

## 2024-06-21 PROCEDURE — 80074 ACUTE HEPATITIS PANEL: CPT | Performed by: FAMILY MEDICINE

## 2024-06-21 PROCEDURE — 3008F BODY MASS INDEX DOCD: CPT | Performed by: FAMILY MEDICINE

## 2024-06-21 PROCEDURE — 82550 ASSAY OF CK (CPK): CPT | Performed by: FAMILY MEDICINE

## 2024-06-21 PROCEDURE — 85652 RBC SED RATE AUTOMATED: CPT | Performed by: FAMILY MEDICINE

## 2024-06-21 NOTE — PROGRESS NOTES
Family Medicine Progress Note  Assessment & Plan:   Dwaine Lee is a 38 year old female who is here for:     1. Alkaline phosphatase elevation  2. Elevated liver enzymes- presenting with diffuse fatigue, arthralgias, and myalgias >2 wks.  Also with elevation In liver enzymes. No alcohol use; no prior elevation.    - Plan to recheck labs as below.   - Check inflamma markers givent he diffuse myalgia and arthralgias.   - RUQ US.   - Sed Rate, Westergren (Automated) [E]; Future  - C-Reactive Protein [E]; Future  - Comp Metabolic Panel (14) [E]; Future  - GGT (Gamma Glutamyl Transpeptidase) [E]; Future  - Lipase [E]; Future  - Connective Tissue Disease (KAVITHA) Screen, Reflex Specific Antibody; Future  - CK (Creatine Kinase) (Not Creatinine) (E); Future  - US ABDOMEN LIMITED (CPT=76705); Future    3. Myalgia-  4. Polyarthralgia  5. B12 LOW- Recc supplement with 1000mcg to imprvoe fatigue.       Follow-Up: Return for pending work-up.      Kim Cade DO   06/21/24      CC: Lab Results (review) and Other (Body aches, sleepiness, fatigue, low energy  and diarrhea. 4 episodes of diarrhea yesterday.)    Subjective:    History of Present Illness:  History obtained from patient.     Dwaine Lee is a 38 year old female who presents for Lab Results (review) and Other (Body aches, sleepiness, fatigue, low energy  and diarrhea. 4 episodes of diarrhea yesterday.)     Back from [place] 06/07-- started feeling off on Tuesday and then Wed-Fri felt off---- Fatigued--- Body aches,   Labs completed with elevation in liver enzmes and Alk phos;  has had some loose stools but does not report any RUQ abd pain.  Does have notable fatigue where she has needed to lie down for a nap.  Also with joint pain that has involved almost all of her peripheral joints.   .   Diet- unhealthy--not much cooking. Eating out. Could be better with F/V.  Heavy on carbs. MVN, inconsistent use.    Exercise-None   MH- \"feels confused\";   emotionally trigger, recent conflict with spouse;   quick to tears/\"breakdown\".      H/O GDM   Migraine  MDD/DYLAN- Wellbutrin 150mg -- Currently Psych and would prefer continued mgmt.   Pshx: CS x2, ?Removal Abd Mass  All: {NKDA  Fam hx: TB-B; Parkinsons-F   Soc hx: , 2 children.   Ob/gyne: Patient's last menstrual period was 2024.. last pap: 2021, last mammogram: -,  ,   Colonoscopy: -       History/Other:   ROS-Per HPI     Problem List:  Patient Active Problem List   Diagnosis    Rh negative, antepartum (HCC)    Mild episode of recurrent major depressive disorder (HCC)    DYLAN (generalized anxiety disorder)       Current Medications:  Current Outpatient Medications   Medication Sig Dispense Refill    buPROPion ER (WELLBUTRIN XL) 150 MG Oral Tablet 24 Hr Take 1 tablet (150 mg total) by mouth daily. 30 tablet 2    Multiple Vitamin (MULTIVITAMIN ADULT) Oral Tab       Vitamin D3, Cholecalciferol, (VITAMIN D3) 125 MCG (5000 UT) Oral Cap Take 5,000 Units by mouth daily. (Patient not taking: Reported on 2024)        Past Medical History:  Past Medical History:    Anxiety    I guess a lot of it    Depression    maybe, jimmie    Gestational diabetes (HCC)    last pregnancy    Hyperlipidemia    Migraines    Obesity    Maybe, jimmie, plz check    Previous  delivery affecting pregnancy, delivered (HCC)    Typhoid    was in [place]      Past Surgical History:  Past Surgical History:   Procedure Laterality Date          non progressive labor- not our office      2019     delivery only  2019    Ines Droan DO    Other surgical history  2019    cervical circlage    Other surgical history  2022    Excision abdominal wall mass       Family History:  Family History   Problem Relation Age of Onset    No Known Problems Mother     Hypertension Father     Other (Other) Father         parkinsens     Other (TB spine) Brother         recd complete rx      Social  History:  Social History     Socioeconomic History    Marital status:    Tobacco Use    Smoking status: Never    Smokeless tobacco: Never   Vaping Use    Vaping status: Never Used   Substance and Sexual Activity    Alcohol use: Yes     Comment: once or twice a year :)    Drug use: Never    Sexual activity: Yes     Partners: Male   Other Topics Concern    Exercise Yes    Special Diet Yes    Stress Concern Yes    Weight Concern Yes     Social Determinants of Health     Financial Resource Strain: Low Risk  (2/7/2022)    Received from [facility]    Overall Financial Resource Strain (CARDIA)     Difficulty of Paying Living Expenses: Not hard at all   Food Insecurity: No Food Insecurity (2/7/2022)    Received from [facility]    Hunger Vital Sign     Worried About Running Out of Food in the Last Year: Never true     Ran Out of Food in the Last Year: Never true   Transportation Needs: No Transportation Needs (2/7/2022)    Received from [facility]    PRAPARE - Transportation     Lack of Transportation (Medical): No     Lack of Transportation (Non-Medical): No   Physical Activity: Inactive (2/7/2022)    Exercise Vital Sign     Days of Exercise per Week: 0 days     Minutes of Exercise per Session: 0 min   Stress: Stress Concern Present (2/7/2022)    Received from [facility]    [place] of Occupational Health - Occupational Stress Questionnaire     Feeling of Stress : To some extent   Social Connections: Moderately Isolated (2/7/2022)    Received from [facility]    Social Connection and Isolation Panel [NHANES]     Frequency of Communication with Friends and Family: More than three times a week     Frequency of Social Gatherings with Friends and Family: Once a week     Attends Faith Services: Never     Active Member of Clubs or Organizations: No     Attends Club or Organization Meetings: Never     Marital Status:    Housing Stability: Low Risk   (2/7/2022)    [facility] from [facility]    Housing Stability Vital Sign     Unable to Pay for Housing in the Last Year: No     Number of Places Lived in the Last Year: 1     Unstable Housing in the Last Year: No       Allergies:  No Known Allergies     Objective:    VITALS: /72   Pulse 82   Temp 97.8 °F (36.6 °C) (Temporal)   Resp 16   Ht 5' 1\" (1.549 m)   Wt 142 lb (64.4 kg)   LMP 06/07/2024   SpO2 99%   BMI 26.83 kg/m²      BP Readings from Last 3 Encounters:   06/21/24 100/72   04/19/24 110/82   03/03/22 110/75     Wt Readings from Last 3 Encounters:   06/21/24 142 lb (64.4 kg)   04/19/24 142 lb (64.4 kg)   03/03/22 127 lb (57.6 kg)     PHYSICAL EXAM  GEN: pleasant, well-appearing in NAD, AOX3  SKIN: no visible rashes, lesions, or evidence of trauma  HEENT: PERRL, EOMI, moist mucous membranes,   CV: RRR, no murmurs or abnl heart sounds   PULM: Clear to auscultation, No wheezes, rales, rhonchi.  Non-labored breathing.  ABD: Soft, non-tender, non-distended, + BS, no rigidity/guarding  MSK: joints for not have signs of synovitis or effusion; described as tight and stiff.   PSYCH: mood and affect are appropriate      No results found for: \"EAG\", \"A1C\"  Lab Results   Component Value Date/Time    CHOLEST 189 06/15/2024 08:10 AM    TRIG 165 (H) 06/15/2024 08:10 AM    HDL 59 06/15/2024 08:10 AM     (H) 06/15/2024 08:10 AM    NONHDLC 130 (H) 06/15/2024 08:10 AM       Lab Results   Component Value Date    WBC 5.5 06/15/2024    RBC 4.58 06/15/2024    HGB 13.5 06/15/2024    HCT 37.7 06/15/2024    MCV 82.3 06/15/2024    MCH 29.5 06/15/2024    MCHC 35.8 06/15/2024    RDW 12.6 06/15/2024    .0 06/15/2024      Lab Results   Component Value Date    GLU 93 06/15/2024    BUN 7 (L) 06/15/2024    BUNCREA 17.0 08/09/2021    CREATSERUM 0.79 06/15/2024    ANIONGAP 5 06/15/2024    CA 8.6 06/15/2024    OSMOCALC 290 06/15/2024    ALKPHO 105 (H) 06/15/2024    AST 71 (H) 06/15/2024     (H)  06/15/2024    BILT 0.3 06/15/2024    TP 7.1 06/15/2024    ALB 3.5 06/15/2024    GLOBULIN 3.6 06/15/2024     06/15/2024    K 4.3 06/15/2024     06/15/2024    CO2 25.0 06/15/2024              Kim Cade, DO    NOTE TO PATIENT: The 21st Century Cures Act makes clinical notes like these available to patients in the interest of transparency. Clinical notes are medical documents used by physicians and care providers to communicate with each other. These documents include medical language and terminology, abbreviations, and treatment information that may sound technical and at times possibly unfamiliar. In addition, at times, the verbiage may appear blunt or direct. These documents are one tool providers use to communicate relevant information and clinical opinions of the care providers in a way that allows common understanding of the clinical context.

## 2024-06-22 ENCOUNTER — HOSPITAL ENCOUNTER (OUTPATIENT)
Dept: ULTRASOUND IMAGING | Facility: HOSPITAL | Age: 39
Discharge: HOME | End: 2024-06-22
Attending: FAMILY MEDICINE

## 2024-06-22 DIAGNOSIS — R74.8 ELEVATED LIVER ENZYMES: ICD-10-CM

## 2024-06-22 DIAGNOSIS — R74.8 ALKALINE PHOSPHATASE ELEVATION: ICD-10-CM

## 2024-06-22 DIAGNOSIS — M79.10 MYALGIA: ICD-10-CM

## 2024-06-22 PROCEDURE — 76705 ECHO EXAM OF ABDOMEN: CPT | Performed by: FAMILY MEDICINE

## 2024-06-23 LAB
HAV IGM SER QL: NONREACTIVE
HBV CORE IGM SER QL: NONREACTIVE
HBV SURFACE AG SERPL QL IA: NONREACTIVE
HCV AB SERPL QL IA: NONREACTIVE

## 2024-06-26 ENCOUNTER — OFFICE VISIT (OUTPATIENT)
Dept: FAMILY MEDICINE CLINIC | Facility: CLINIC | Age: 39
End: 2024-06-26

## 2024-06-26 VITALS
TEMPERATURE: 98 F | SYSTOLIC BLOOD PRESSURE: 100 MMHG | RESPIRATION RATE: 16 BRPM | HEIGHT: 61 IN | WEIGHT: 143 LBS | HEART RATE: 68 BPM | OXYGEN SATURATION: 99 % | BODY MASS INDEX: 27 KG/M2 | DIASTOLIC BLOOD PRESSURE: 60 MMHG

## 2024-06-26 DIAGNOSIS — K76.0 FATTY INFILTRATION OF LIVER: ICD-10-CM

## 2024-06-26 DIAGNOSIS — R74.8 ALKALINE PHOSPHATASE ELEVATION: Primary | ICD-10-CM

## 2024-06-26 DIAGNOSIS — R74.8 ELEVATED LIVER ENZYMES: ICD-10-CM

## 2024-06-26 DIAGNOSIS — M25.50 POLYARTHRALGIA: ICD-10-CM

## 2024-06-26 DIAGNOSIS — Z12.31 ENCOUNTER FOR SCREENING MAMMOGRAM FOR MALIGNANT NEOPLASM OF BREAST: ICD-10-CM

## 2024-06-26 DIAGNOSIS — M79.10 MYALGIA: ICD-10-CM

## 2024-06-26 LAB
DSDNA IGG SERPL IA-ACNC: 3.3 IU/ML
ENA AB SER QL IA: 0.2 UG/L
ENA AB SER QL IA: NEGATIVE

## 2024-06-26 PROCEDURE — 99213 OFFICE O/P EST LOW 20 MIN: CPT | Performed by: FAMILY MEDICINE

## 2024-06-26 PROCEDURE — 3078F DIAST BP <80 MM HG: CPT | Performed by: FAMILY MEDICINE

## 2024-06-26 PROCEDURE — 3008F BODY MASS INDEX DOCD: CPT | Performed by: FAMILY MEDICINE

## 2024-06-26 PROCEDURE — 3074F SYST BP LT 130 MM HG: CPT | Performed by: FAMILY MEDICINE

## 2024-06-26 NOTE — PROGRESS NOTES
Family Medicine Progress Note  Assessment & Plan:   Dwaine Lee is a 38 year old female who is here for:     1. Alkaline phosphatase elevation  2. Elevated liver enzymes- presenting with diffuse fatigue, arthralgias, and myalgias >2 wks.  Also with elevation In liver enzymes/AP. No alcohol use; no prior elevation.  GGT+ , ESR/CRP elevated, KAVITHA negative. RUQ US with fatty infiltration; ? vIral Infection vs more insidious process.    - Plan to recheck labs as below in 2wks,   - Check inflamma markers givent he diffuse myalgia and arthralgias.   - Comp Metabolic Panel (14) [E]; Future  - Actin (Smooth Muscle) Antibody; Future  - Mitochondrial (M2) Antibody [E]; Future  - Ferritin [E]; Future    3. Myalgia  4. Polyarthralgia  5. Fatty infiltration of liver      6. Encounter for screening mammogram for malignant neoplasm of breast  - [facility] LUCIA 2D+3D SCREENING BILAT (CPT=77067/51623); Future       Follow-Up: No follow-ups on file.      Kim Cade DO   06/26/24      CC: Follow - Up    Subjective:    History of Present Illness:  History obtained from patient.     Dwaine Lee is a 38 year old female who presents for Follow - Up     FATIGUE-Back from [place] 06/07-- started feeling off on Tuesday and then Wed-Fri felt off---- Fatigued--- Body aches,   Labs completed with elevation in liver enzmes and Alk phos;  has had some loose stools but does not report any RUQ abd pain.  Does have notable fatigue where she has needed to lie down for a nap.  Also with joint pain that has involved almost all of her peripheral joints.   6/26-Labs initially completed with elevated liver enzymes, repeated with increase and GGT+ , ESR/CRP elevated, KAVITHA negative. RUQ US with fatty infiltration.  Acute hepatitis panel was negative.  Since last visit pt thought she was doing better from a fatigue and muyalgias stand point but then she tried going to the city for work y[place], theres a 10m walk---- She was exhausted and had  notable myalgias.     Diet- unhealthy--not much cooking. Eating out. Could be better with F/V.  Heavy on carbs. MVN, inconsistent use.    Exercise-None   MH- \"feels confused\";  emotionally trigger, recent conflict with spouse;   quick to tears/\"breakdown\".      H/O GDM   Migraine  MDD/DYLAN- Wellbutrin 150mg -- Currently Psych and would prefer continued mgmt.   Pshx: CS x2, ?Removal Abd Mass  All: {NKDA  Fam hx: TB-B; Parkinsons-F   Soc hx: , 2 children.   Ob/gyne: Patient's last menstrual period was 2024.. last pap: 2021, last mammogram: -,  ,   Colonoscopy: -       History/Other:   ROS-Per HPI     Problem List:  Patient Active Problem List   Diagnosis    Rh negative, antepartum (HCC)    Mild episode of recurrent major depressive disorder (HCC)    DYLAN (generalized anxiety disorder)    Fatty infiltration of liver       Current Medications:  Current Outpatient Medications   Medication Sig Dispense Refill    buPROPion ER (WELLBUTRIN XL) 150 MG Oral Tablet 24 Hr Take 1 tablet (150 mg total) by mouth daily. 30 tablet 2    Multiple Vitamin (MULTIVITAMIN ADULT) Oral Tab       Vitamin D3, Cholecalciferol, (VITAMIN D3) 125 MCG (5000 UT) Oral Cap Take 5,000 Units by mouth daily. (Patient not taking: Reported on 2024)        Past Medical History:  Past Medical History:    Anxiety    I guess a lot of it    Depression    maybe, jimmie    Gestational diabetes (HCC)    last pregnancy    Hyperlipidemia    Migraines    Obesity    Maybe, jimmie, plz check    Previous  delivery affecting pregnancy, delivered (HCC)    Typhoid    was in [place]      Past Surgical History:  Past Surgical History:   Procedure Laterality Date          non progressive labor- not our office      2019     delivery only  2019    Ines Doran DO    Other surgical history  2019    cervical circlage    Other surgical history  2022    Excision abdominal wall mass       Family  History:  Family History   Problem Relation Age of Onset    No Known Problems Mother     Hypertension Father     Other (Other) Father         parkinsens     Other (TB spine) Brother         recd complete rx      Social History:  Social History     Socioeconomic History    Marital status:    Tobacco Use    Smoking status: Never    Smokeless tobacco: Never   Vaping Use    Vaping status: Never Used   Substance and Sexual Activity    Alcohol use: Yes     Comment: once or twice a year :)    Drug use: Never    Sexual activity: Yes     Partners: Male   Other Topics Concern    Exercise Yes    Special Diet Yes    Stress Concern Yes    Weight Concern Yes     Social Determinants of Health     Financial Resource Strain: Low Risk  (2/7/2022)    Received from [facility]    Overall Financial Resource Strain (CARDIA)     Difficulty of Paying Living Expenses: Not hard at all   Food Insecurity: No Food Insecurity (2/7/2022)    Received from [facility]    Hunger Vital Sign     Worried About Running Out of Food in the Last Year: Never true     Ran Out of Food in the Last Year: Never true   Transportation Needs: No Transportation Needs (2/7/2022)    Received from [facility]    PRAPARE - Transportation     Lack of Transportation (Medical): No     Lack of Transportation (Non-Medical): No   Physical Activity: Inactive (2/7/2022)    Exercise Vital Sign     Days of Exercise per Week: 0 days     Minutes of Exercise per Session: 0 min   Stress: Stress Concern Present (2/7/2022)    Received from [facility]    [place] of Occupational Health - Occupational Stress Questionnaire     Feeling of Stress : To some extent   Social Connections: Moderately Isolated (2/7/2022)    Received from [facility]    Social Connection and Isolation Panel [NHANES]     Frequency of Communication with Friends and Family: More than three times a week     Frequency of Social Gatherings with Friends and  Family: Once a week     Attends Hindu Services: Never     Active Member of Clubs or Organizations: No     Attends Club or Organization Meetings: Never     Marital Status:    Housing Stability: Low Risk  (2/7/2022)    Received from [facility]    Housing Stability Vital Sign     Unable to Pay for Housing in the Last Year: No     Number of Places Lived in the Last Year: 1     Unstable Housing in the Last Year: No       Allergies:  No Known Allergies     Objective:    VITALS: /60   Pulse 68   Temp 97.8 °F (36.6 °C) (Temporal)   Resp 16   Ht 5' 1\" (1.549 m)   Wt 143 lb (64.9 kg)   LMP 06/07/2024   SpO2 99%   BMI 27.02 kg/m²      BP Readings from Last 3 Encounters:   06/26/24 100/60   06/21/24 100/72   04/19/24 110/82     Wt Readings from Last 3 Encounters:   06/26/24 143 lb (64.9 kg)   06/21/24 142 lb (64.4 kg)   04/19/24 142 lb (64.4 kg)     PHYSICAL EXAM  GEN: pleasant, well-appearing in NAD  SKIN: no visible rashes, lesions, or evidence of trauma  HEENT:  no conjunctivitis or scleral icterus; mmm  PULM: breathing comfortably on room air  MSK: moving all extremities well, no weakness or joint tenderness  NEURO: CNs grossly intact, no focal weakness, alert and oriented         Latest Reference Range & Units 06/15/24 08:10 06/21/24 13:59   Glucose 70 - 99 mg/dL 93 87   Sodium 136 - 145 mmol/L 141 136   Potassium 3.5 - 5.1 mmol/L 4.3 4.3   Chloride 98 - 112 mmol/L 111 108   Carbon Dioxide, Total 21.0 - 32.0 mmol/L 25.0 24.0   BUN 9 - 23 mg/dL 7 (L) 9   CREATININE 0.55 - 1.02 mg/dL 0.79 0.85   CALCIUM 8.5 - 10.1 mg/dL 8.6 9.0   EGFR >=60 mL/min/1.73m2 97 89   ANION GAP 0 - 18 mmol/L 5 4   CALCULATED OSMOLALITY 275 - 295 mOsm/kg 290 280   ALKALINE PHOSPHATASE 37 - 98 U/L 105 (H) 130 (H)   AST (SGOT) 15 - 37 U/L 71 (H) 65 (H)   ALT (SGPT) 13 - 56 U/L 187 (H) 194 (H)   Total Bilirubin 0.1 - 2.0 mg/dL 0.3 0.3   Globulin 2.8 - 4.4 g/dL 3.6 3.9   GAMMA GLUTAMYL TRANSFERASE 5 - 55 U/L  60 (H)    Lipase <=300 U/L  26   Vitamin B12 193 - 986 pg/mL 360    Cholesterol, Total <200 mg/dL 189    Triglycerides 30 - 149 mg/dL 165 (H)    HDL Cholesterol 40 - 59 mg/dL 59    VLDL 0 - 30 mg/dL 28    NON-HDL CHOLESTEROL <130 mg/dL 130 (H)    LDL Cholesterol Calc <100 mg/dL 102 (H)    CK 26 - 192 U/L  44   A/G Ratio 1.0 - 2.0  1.0 1.0   PROTEIN, TOTAL 6.4 - 8.2 g/dL 7.1 7.8   Albumin 3.4 - 5.0 g/dL 3.5 3.9   VITAMIN D, 25-OH, TOTAL 30.0 - 100.0 ng/mL 30.2    C-REACTIVE PROTEIN <0.30 mg/dL  0.88 (H)   Patient Fasting for CMP?  Yes No   Patient Fasting for Lipid?  Yes    TSH 0.358 - 3.740 mI 1.240    HEPATITIS A AB, IGM Nonreactive    Nonreactive   HBc IgM AB Nonreactive    Nonreactive   HBSAg Screen Nonreactive    Nonreactive   HCV AB Nonreactive    Nonreactive   Expanded JESUS Antibody Screen, IGG <0.7 ug/l  0.20   Connective Tissue Disease Screen Interpretation Negative   Negative   Anti-dsDNA antibody <10 IU/mL  3.3   WBC 4.0 - 11.0 x10(3) uL 5.5    Hemoglobin 12.0 - 16.0 g/dL 13.5    Hematocrit 35.0 - 48.0 % 37.7    Platelet Count 150.0 - 450.0 10(3)uL 192.0    RBC 3.80 - 5.30 x10( 4.58    MCH 26.0 - 34.0 pg 29.5    MCHC 31.0 - 37.0 g/dL 35.8    MCV 80.0 - 100.0 fL 82.3    RDW % 12.6    Prelim Neutrophil Abs 1.50 - 7.70 x10 (3) uL 2.77    Neutrophils Absolute 1.50 - 7.70 x10(3) uL 2.77    Lymphocytes Absolute 1.00 - 4.00 x10(3) uL 1.97    Monocytes Absolute 0.10 - 1.00 x10(3) uL 0.57    Eosinophils Absolute 0.00 - 0.70 x10(3) uL 0.16    Basophils Absolute 0.00 - 0.20 x10(3) uL 0.03    Immature Granulocyte Absolute 0.00 - 1.00 x10(3) uL 0.01    Neutrophils % % 50.3    Lymphocytes % % 35.8    Monocytes % % 10.3    Eosinophils % % 2.9    Basophils % % 0.5    Immature Granulocyte % % 0.2    SLIDE REVIEW  Slide reviewed, normal WBC, RBC, and platelet morphology.   SED RATE 0 - 20 mm/Hr  19   (L): Data is abnormally low  (H): Data is abnormally high    US ABDOMEN LIMITED (CPT=76705)    Result Date: 6/22/2024  CONCLUSION:  1.  Hepatic steatosis without focal mass.  Normal gallbladder and biliary tree. 2. Normal head and body of pancreas, tail obscured by bowel gas.    LOCATION:  [facility]   Dictated by (CST): River Call MD on 6/22/2024 at 9:32 AM     Finalized by (CST): River Call MD on 6/22/2024 at 9:35 AM             Kim Cade,     NOTE TO PATIENT: The 21st Century Cures Act makes clinical notes like these available to patients in the interest of transparency. Clinical notes are medical documents used by physicians and care providers to communicate with each other. These documents include medical language and terminology, abbreviations, and treatment information that may sound technical and at times possibly unfamiliar. In addition, at times, the verbiage may appear blunt or direct. These documents are one tool providers use to communicate relevant information and clinical opinions of the care providers in a way that allows common understanding of the clinical context.

## 2025-02-10 ENCOUNTER — NURSE TRIAGE (OUTPATIENT)
Dept: FAMILY MEDICINE CLINIC | Facility: CLINIC | Age: 40
End: 2025-02-10

## 2025-02-10 ENCOUNTER — HOSPITAL ENCOUNTER (EMERGENCY)
Facility: HOSPITAL | Age: 40
Discharge: HOME | End: 2025-02-11
Attending: EMERGENCY MEDICINE
Payer: COMMERCIAL

## 2025-02-10 DIAGNOSIS — R06.02 SHORTNESS OF BREATH: Primary | ICD-10-CM

## 2025-02-10 LAB
ALBUMIN SERPL-MCNC: 4.1 G/DL (ref 3.2–4.8)
ALBUMIN/GLOB SERPL: 1.4 {RATIO} (ref 1–2)
ALP LIVER SERPL-CCNC: 79 U/L
ALT SERPL-CCNC: 37 U/L
ANION GAP SERPL CALC-SCNC: 8 MMOL/L (ref 0–18)
AST SERPL-CCNC: 23 U/L (ref ?–34)
BASOPHILS # BLD AUTO: 0.03 X10(3) UL (ref 0–0.2)
BASOPHILS NFR BLD AUTO: 0.3 %
BILIRUB SERPL-MCNC: 0.3 MG/DL (ref 0.3–1.2)
BUN BLD-MCNC: 8 MG/DL (ref 9–23)
CALCIUM BLD-MCNC: 8.8 MG/DL (ref 8.7–10.6)
CHLORIDE SERPL-SCNC: 108 MMOL/L (ref 98–112)
CO2 SERPL-SCNC: 22 MMOL/L (ref 21–32)
CREAT BLD-MCNC: 0.7 MG/DL
EGFRCR SERPLBLD CKD-EPI 2021: 111 ML/MIN/1.73M2 (ref 60–?)
EOSINOPHIL # BLD AUTO: 0.39 X10(3) UL (ref 0–0.7)
EOSINOPHIL NFR BLD AUTO: 3.7 %
ERYTHROCYTE [DISTWIDTH] IN BLOOD BY AUTOMATED COUNT: 12.8 %
GLOBULIN PLAS-MCNC: 3 G/DL (ref 2–3.5)
GLUCOSE BLD-MCNC: 85 MG/DL (ref 70–99)
HCT VFR BLD AUTO: 38.9 %
HGB BLD-MCNC: 13.9 G/DL
IMM GRANULOCYTES # BLD AUTO: 0.02 X10(3) UL (ref 0–1)
IMM GRANULOCYTES NFR BLD: 0.2 %
LYMPHOCYTES # BLD AUTO: 2.68 X10(3) UL (ref 1–4)
LYMPHOCYTES NFR BLD AUTO: 25.5 %
MCH RBC QN AUTO: 29.6 PG (ref 26–34)
MCHC RBC AUTO-ENTMCNC: 35.7 G/DL (ref 31–37)
MCV RBC AUTO: 82.8 FL
MONOCYTES # BLD AUTO: 0.88 X10(3) UL (ref 0.1–1)
MONOCYTES NFR BLD AUTO: 8.4 %
NEUTROPHILS # BLD AUTO: 6.49 X10 (3) UL (ref 1.5–7.7)
NEUTROPHILS # BLD AUTO: 6.49 X10(3) UL (ref 1.5–7.7)
NEUTROPHILS NFR BLD AUTO: 61.9 %
NT-PROBNP SERPL-MCNC: <35 PG/ML (ref ?–125)
OSMOLALITY SERPL CALC.SUM OF ELEC: 284 MOSM/KG (ref 275–295)
PLATELET # BLD AUTO: 232 10(3)UL (ref 150–450)
POTASSIUM SERPL-SCNC: 3.8 MMOL/L (ref 3.5–5.1)
PROT SERPL-MCNC: 7.1 G/DL (ref 5.7–8.2)
RBC # BLD AUTO: 4.7 X10(6)UL
SODIUM SERPL-SCNC: 138 MMOL/L (ref 136–145)
TROPONIN I SERPL HS-MCNC: <3 NG/L
TROPONIN I SERPL HS-MCNC: <3 NG/L
WBC # BLD AUTO: 10.5 X10(3) UL (ref 4–11)

## 2025-02-10 PROCEDURE — 93005 ELECTROCARDIOGRAM TRACING: CPT

## 2025-02-10 PROCEDURE — 93010 ELECTROCARDIOGRAM REPORT: CPT

## 2025-02-10 PROCEDURE — 99284 EMERGENCY DEPT VISIT MOD MDM: CPT

## 2025-02-10 PROCEDURE — 99285 EMERGENCY DEPT VISIT HI MDM: CPT

## 2025-02-10 PROCEDURE — 85025 COMPLETE CBC W/AUTO DIFF WBC: CPT | Performed by: EMERGENCY MEDICINE

## 2025-02-10 PROCEDURE — 80053 COMPREHEN METABOLIC PANEL: CPT | Performed by: EMERGENCY MEDICINE

## 2025-02-10 PROCEDURE — 83880 ASSAY OF NATRIURETIC PEPTIDE: CPT | Performed by: EMERGENCY MEDICINE

## 2025-02-10 PROCEDURE — 84484 ASSAY OF TROPONIN QUANT: CPT | Performed by: EMERGENCY MEDICINE

## 2025-02-10 PROCEDURE — 36415 COLL VENOUS BLD VENIPUNCTURE: CPT

## 2025-02-10 NOTE — TELEPHONE ENCOUNTER
Called patient with provider response. Patient is agreeable to go to IC, she may go to a [facility] location closer to her. Advised her we will be able to see those records. Patient requests to schedule follow up appointment. Appointment scheduled.

## 2025-02-10 NOTE — TELEPHONE ENCOUNTER
Action Requested: Summary for Provider     []  Critical Lab, Recommendations Needed  [x] Need Additional Advice  []   FYI    []   Need Orders  [] Need Medications Sent to Pharmacy  []  Other     SUMMARY: Patient reports heart palpitations, mild shortness of breath (patient speaking in full sentences without difficulty), BP yesterday was 130/90, eyes \"feel swollen\", fingers \"feel swollen\", denies visible swelling. Patient denies symptoms of panic attack but does report she feels anxious \"all the time\".     Dr Cade - next available appointment 2/18/25. Are you able to see patient this week, Tues or Fri?    Reason for call: Shortness Of Breath  Onset: 2 days     Reason for Disposition   MILD longstanding difficulty breathing (e.g., speaks in phrases, SOB even at rest, pulse 100-120) and SAME as normal    Protocols used: Breathing Difficulty-A-OH

## 2025-02-11 VITALS
BODY MASS INDEX: 27.06 KG/M2 | HEIGHT: 61 IN | HEART RATE: 71 BPM | OXYGEN SATURATION: 100 % | SYSTOLIC BLOOD PRESSURE: 108 MMHG | WEIGHT: 143.31 LBS | RESPIRATION RATE: 17 BRPM | TEMPERATURE: 98 F | DIASTOLIC BLOOD PRESSURE: 72 MMHG

## 2025-02-11 LAB
ATRIAL RATE: 66 BPM
P AXIS: 64 DEGREES
P-R INTERVAL: 132 MS
Q-T INTERVAL: 382 MS
QRS DURATION: 78 MS
QTC CALCULATION (BEZET): 400 MS
R AXIS: 64 DEGREES
T AXIS: 40 DEGREES
VENTRICULAR RATE: 66 BPM

## 2025-02-11 NOTE — ED PROVIDER NOTES
Patient Seen in: [facility] Emergency Department      History     Chief Complaint   Patient presents with    Abnormal Result     Stated Complaint: reports elevated troponin levels per MD    Subjective:   HPI  Patient is a 40 yo F with a history of anxiety/depression, HLD who resents to ED sent in from IC for evaluation of SOB, palpitations over past 3 days. Patient reports that her symptoms started on Saturday after yelling after  to do something. Her SOB is constant, worse with talking but not with exertion. No orthopnea or PND. Reports occasional palpitations for a few seconds intermittently. Denies any fevers, CP, vomiting, abd pain. No hx of blood clots, recent surgeries or immobilization, exogenous hormone use, hx of cancer, tobacco use. Denies any cardiac history or family history of early cardiac disease. Reports she had similar SOB last year with unknown cause that she saw her PCP for. Pt states she had viral URI 3 weeks ago, but denies any current fevers or coughing.     Pt states she was sent in from IC for abnormal troponin. Per IC note, pt had elevated troponin so she was sent to ED. Troponin is not in our system, but pt has [email or web address] troponin results which showed troponin was negative (<6). She also had CBC, CMP, D-dimer which were unremarkable.     Objective:     Past Medical History:    Anxiety    I guess a lot of it    Depression    maybe, jimmie    Gestational diabetes (HCC)    last pregnancy    Hyperlipidemia    Migraines    Obesity    Maybe, jimmie, plz check    Previous  delivery affecting pregnancy, delivered (HCC)    Typhoid    was in [place]              Past Surgical History:   Procedure Laterality Date          non progressive labor- not our office      2019     delivery only  2019    Ines Doran DO    Other surgical history  2019    cervical circlage    Other surgical history  2022    Excision abdominal wall mass                  Social History     Socioeconomic History    Marital status:    Tobacco Use    Smoking status: Never    Smokeless tobacco: Never   Vaping Use    Vaping status: Never Used   Substance and Sexual Activity    Alcohol use: Yes     Comment: once or twice a year :)    Drug use: Never    Sexual activity: Yes     Partners: Male   Other Topics Concern    Exercise Yes    Special Diet Yes    Stress Concern Yes    Weight Concern Yes     Social Drivers of Health     Food Insecurity: No Food Insecurity (2/7/2022)    Received from [facility]    Hunger Vital Sign     Worried About Running Out of Food in the Last Year: Never true     Ran Out of Food in the Last Year: Never true   Transportation Needs: No Transportation Needs (2/7/2022)    Received from [facility]    PRAPARE - Transportation     Lack of Transportation (Medical): No     Lack of Transportation (Non-Medical): No   Housing Stability: Low Risk  (2/7/2022)    Received from [facility]    Housing Stability Vital Sign     Unable to Pay for Housing in the Last Year: No     Number of Places Lived in the Last Year: 1     Unstable Housing in the Last Year: No                  Physical Exam     ED Triage Vitals   BP 02/10/25 2019 (!) 109/99   Pulse 02/10/25 2019 68   Resp 02/10/25 2019 18   Temp 02/10/25 2138 97.6 °F (36.4 °C)   Temp src 02/10/25 2138 Temporal   SpO2 02/10/25 2019 100 %   O2 Device 02/10/25 2019 None (Room air)       Current Vitals:   Vital Signs  BP: 108/72  Pulse: 71  Resp: 17  Temp: 97.6 °F (36.4 °C)  Temp src: Temporal  MAP (mmHg): 85    Oxygen Therapy  SpO2: 100 %  O2 Device: None (Room air)        Physical Exam  Vitals and nursing note reviewed.   Constitutional:       General: She is not in acute distress.     Appearance: She is not ill-appearing.   HENT:      Head: Normocephalic and atraumatic.      Mouth/Throat:      Mouth: Mucous membranes are moist.   Eyes:      Extraocular Movements: Extraocular movements  intact.      Pupils: Pupils are equal, round, and reactive to light.   Cardiovascular:      Rate and Rhythm: Normal rate and regular rhythm.   Pulmonary:      Effort: Pulmonary effort is normal. No respiratory distress.      Breath sounds: Normal breath sounds. No wheezing or rales.   Abdominal:      General: There is no distension.      Palpations: Abdomen is soft.      Tenderness: There is no abdominal tenderness.   Musculoskeletal:      Cervical back: Neck supple.      Right lower leg: No edema.      Left lower leg: No edema.   Skin:     General: Skin is warm and dry.      Capillary Refill: Capillary refill takes less than 2 seconds.   Neurological:      General: No focal deficit present.      Mental Status: She is alert.   Psychiatric:         Mood and Affect: Mood normal.             ED Course     Labs Reviewed   COMP METABOLIC PANEL (14) - Abnormal; Notable for the following components:       Result Value    BUN 8 (*)     All other components within normal limits   TROPONIN I HIGH SENSITIVITY - Normal   PRO BETA NATRIURETIC PEPTIDE - Normal   TROPONIN I HIGH SENSITIVITY - Normal   CBC WITH DIFFERENTIAL WITH PLATELET   RAINBOW DRAW LAVENDER   RAINBOW DRAW LIGHT GREEN   RAINBOW DRAW BLUE     EKG    Rate, intervals and axes as noted on EKG Report.  Rate: 66  Rhythm: Sinus Rhythm  Reading: NSR with ventricular rate of 66, no acute ST elevations or depressions, normal intervals.                      MDM    Differential diagnosis before testing included but not limited to anemia, PE, ACS, viral infection, arrhythmia, fluid overload, anxiety    Patient is a 38 yo F with a history of anxiety/depression, HLD who resents to ED for evaluation of SOB, palpitations over past 3 days. VSS. Pt is satting well on RA. Lungs CTAB. No resp distress. No peripheral edema or calf TTP.     - Labs from IC reviewed. Unable to view troponin which was questionably elevated from IC note, however CBC, CMP and D-dimer were unremarkable. On  pt's phone, troponin appears to be negative or <6.  - EKG shows NSR, non ischemic.   - CXR at  showed no acute process  - Labs obtained in ED. CBC, CMP unremarkable. Troponin negative x2. BNP wnl.   - Pt was able to road test with normal oxygenation.   - Bedside US showed no large pericardial effusion, grossly normal EF, no obviously enlarged RV.   - Discussed with [facility] NP. Unclear etiology of SOB at this time. However given unremarkable work up here with 2 negative troponins, normal vitals, pt is stable for DC Home with outpatient cards and PCP f/u. [facility] to call pt to make appt. Pt has PCP f/u scheduled for next week.     Discussed strict return precautions and supportive care. Patient verbalized understanding and agreement of plan.           Medical Decision Making  Amount and/or Complexity of Data Reviewed  External Data Reviewed: labs, radiology and notes.     Details:  note  Labs: ordered. Decision-making details documented in ED Course.  ECG/medicine tests: ordered and independent interpretation performed. Decision-making details documented in ED Course.  Discussion of management or test interpretation with external provider(s): [facility]        Disposition and Plan     Clinical Impression:  1. Shortness of breath         Disposition:  Discharge  2/10/2025 11:57 pm    Follow-up:  [address] [address] [phone]  Schedule an appointment as soon as possible for a visit            Medications Prescribed:  Discharge Medication List as of 2/11/2025 12:11 AM              Supplementary Documentation:

## 2025-02-11 NOTE — DISCHARGE INSTRUCTIONS
You were seen in the emergency department for shortness of breath.  You will be called by cardiology to make an appointment.  Please return to the ER if develop severe chest pain, difficulty breathing, dizziness or any new concerns or worsening symptoms.  Please also follow-up with your primary care physician.

## 2025-02-11 NOTE — ED INITIAL ASSESSMENT (HPI)
Palpitations and shortness of breath since Saturday  patient was seen in immediate care today blood test shows elevated troponin sent here for  further evaluation . No chest pain .  Patient is very anxious.denies other medical problem

## 2025-02-20 ENCOUNTER — OFFICE VISIT (OUTPATIENT)
Dept: FAMILY MEDICINE CLINIC | Facility: CLINIC | Age: 40
End: 2025-02-20
Payer: COMMERCIAL

## 2025-02-20 VITALS
SYSTOLIC BLOOD PRESSURE: 110 MMHG | RESPIRATION RATE: 16 BRPM | HEART RATE: 72 BPM | DIASTOLIC BLOOD PRESSURE: 72 MMHG | HEIGHT: 61 IN | WEIGHT: 151 LBS | OXYGEN SATURATION: 100 % | BODY MASS INDEX: 28.51 KG/M2 | TEMPERATURE: 97 F

## 2025-02-20 DIAGNOSIS — R60.0 PERIORBITAL EDEMA: ICD-10-CM

## 2025-02-20 DIAGNOSIS — R06.02 SHORTNESS OF BREATH: ICD-10-CM

## 2025-02-20 DIAGNOSIS — R53.83 FATIGUE, UNSPECIFIED TYPE: Primary | ICD-10-CM

## 2025-02-20 DIAGNOSIS — R63.5 WEIGHT GAIN: ICD-10-CM

## 2025-02-20 DIAGNOSIS — R00.2 PALPITATIONS: ICD-10-CM

## 2025-02-20 DIAGNOSIS — R74.8 ELEVATED LIVER ENZYMES: ICD-10-CM

## 2025-02-20 PROCEDURE — 99215 OFFICE O/P EST HI 40 MIN: CPT | Performed by: FAMILY MEDICINE

## 2025-02-20 PROCEDURE — 3078F DIAST BP <80 MM HG: CPT | Performed by: FAMILY MEDICINE

## 2025-02-20 PROCEDURE — 3074F SYST BP LT 130 MM HG: CPT | Performed by: FAMILY MEDICINE

## 2025-02-20 PROCEDURE — 3008F BODY MASS INDEX DOCD: CPT | Performed by: FAMILY MEDICINE

## 2025-02-20 NOTE — PROGRESS NOTES
Family Medicine Progress Note  Assessment & Plan:   Follow-Up: Return in about 2 months (around 4/20/2025).        Assessment & Plan  Fatigue, unspecified type  Must r/o thyroid, liver, kidney and metabolic diseases as well as new-onset diabetes and anemia as source of fatigue. Exam without any prominent abnormalities.-- mild edema;  - Labs: CMP, HbA1c, CBC, TSH, Vitamin D., b12, inflamm markers  - Patient was educated regarding the importance of exercise, stress/anxiety reduction, moderating caffeine intake, proper sleep hygiene for getting healthy and adequate amounts of rest,   Orders:    TSH W Reflex To Free T4    B12 AND FOLATE [4428] [Q]    Vitamin D, 25-Hydroxy    Elevated liver enzymes  Elevated liver enzymes on previous tests. Plan to repeat liver function tests to monitor current status.  - Order GGT  - Monitor liver function and review results  Orders:    Mitochondrial (M2) Antibody    Actin (Smooth Muscle) Antibody    GGT (Gamma Glutamyl Transpeptidase)    Weight gain  Rapid weight gain of 8-9 pounds over the last three months, accompanied by facial swelling. Differential diagnosis includes hormonal disruption, perimenopausal symptoms, and potential fluid retention. Discussed anti-inflammatory diet and reducing processed foods and white starchy carbs. Hormonal tests will be ordered to rule out significant abnormalities.  - Order thyroid function tests (TSH)  - Order inflammatory markers (ESR, CRP)  - Order D-dimer  - Order hormonal panel (testosterone, LH, FSH)  - Advise anti-inflammatory diet, reduce processed foods and white starchy carbs  - Monitor for any changes in symptoms and follow up in early April  Orders:    FSH AND LH [5108] [Q]    Testosterone,Total and Weakly Bound w/ SHBG    Urinalysis, Routine    Periorbital edema  See above.    Orders:    Urinalysis, Routine    Microalb/Creat Ratio, Random Urine    Shortness of breath    Orders:    D-Dimer    Sed Rate, Westergren (Automated)     C-Reactive Protein    Palpitations  Intermittent heart palpitations and shortness of breath since February 8, 2025. Initial EKG was normal, but elevated troponin levels led to ER visit where subsequent tests were normal. Cardiologist follow-up includes plans for a heart monitor, stress test, and additional blood work. Potential perimenopausal symptoms discussed. Neuroendocrine testing not immediately necessary unless symptoms persist.  - Follow up with cardiologist for heart monitor, stress test, and additional blood work  - Monitor for return of shortness of breath and report if it recurs            Subjective:    History of Present Illness:  History obtained from patient.   The following individual(s) verbally consented to be recorded using ambient AI listening technology and understand that they can each withdraw their consent to this listening technology at any point by asking the clinician to turn off or pause the recording:  Patient name: Dwaine Lee   CC: Urgent Care F/u (For shortness of breath)    History of Present Illness  David Lee is a 39 year old female who presents with heart palpitations and shortness of breath.    PALPITATIONS--Palpitations and SOB started on 2/8;  seen at walk on clinic 2/10-EKG WNL, but was told Trp Elevation- NSTEMI--Sent to the ER- Trp repeated- normal. Follow-up with [facility] 2/14- [facility] heart monitor, stress test, labs     She reports a rapid weight gain of 8-9 pounds over the last three months, accompanied by facial and hand swelling, and a feeling of slowness in movement. She describes her face as 'swollen' and her eyes as 'shrunk'.    She has a history of abnormal liver function tests and is concerned about potential hormonal disruptions. She notes a change in her menstrual cycle frequency, now occurring every 21 days instead of the previous 28-day cycle. Her periods are regular in flow, with the first two days being heavier.    She mentions a family  history of Parkinson's disease, as her father had the condition. She is concerned about her symptoms and their impact on her daily life, including feeling 'very off' and not like her.     Diet- unhealthy--not much cooking. Eating out. Could be better with F/V.  Heavy on carbs. MVN, inconsistent use.    Exercise-None   MH- \"feels confused\";  emotionally trigger, recent conflict with spouse;   quick to tears/\"breakdown\".      H/O GDM   Migraine  MDD/DYLAN- Wellbutrin 150mg -- Currently Psych and would prefer continued mgmt.   Pshx: CS x2, ?Removal Abd Mass  All: {NKDA  Fam hx: TB-B; Parkinsons-F   Soc hx: , 2 children.   Ob/gyne: Patient's last menstrual period was 2025 (exact date).. last pap: 2024, last mammogram: -,  ,   Colonoscopy: -       History/Other:   ROS-Per HPI     Problem List:  Patient Active Problem List   Diagnosis    Rh negative, antepartum (HCC)    Mild episode of recurrent major depressive disorder    DYLAN (generalized anxiety disorder)    Fatty infiltration of liver    Shortness of breath       Current Medications:  Current Outpatient Medications   Medication Sig Dispense Refill    buPROPion ER (WELLBUTRIN XL) 150 MG Oral Tablet 24 Hr Take 1 tablet (150 mg total) by mouth daily. 30 tablet 2    Multiple Vitamin (MULTIVITAMIN ADULT) Oral Tab       Vitamin D3, Cholecalciferol, (VITAMIN D3) 125 MCG (5000 UT) Oral Cap Take 5,000 Units by mouth daily. (Patient not taking: Reported on 2025)        Past Medical History:  Past Medical History:    Anxiety    I guess a lot of it    Depression    maybe, jimmie    Gestational diabetes (HCC)    last pregnancy    Hyperlipidemia    Migraines    Obesity    Maybe, jimmie, plz check    Previous  delivery affecting pregnancy, delivered (HCC)    Typhoid    was in [place]      Past Surgical History:  Past Surgical History:   Procedure Laterality Date          non progressive labor- not our office      2019      delivery only  04/2019    Ines Doran DO    Other surgical history  03/2019    cervical circlage    Other surgical history  03/02/2022    Excision abdominal wall mass       Family History:  Family History   Problem Relation Age of Onset    No Known Problems Mother     Hypertension Father     Other (Other) Father         parkinsens     Other (TB spine) Brother         recd complete rx      Social History:  Social History     Socioeconomic History    Marital status:    Tobacco Use    Smoking status: Never    Smokeless tobacco: Never   Vaping Use    Vaping status: Never Used   Substance and Sexual Activity    Alcohol use: Yes     Comment: once or twice a year :)    Drug use: Never    Sexual activity: Yes     Partners: Male   Other Topics Concern    Exercise Yes    Special Diet Yes    Stress Concern Yes    Weight Concern Yes     Social Drivers of Health     Food Insecurity: No Food Insecurity (2/7/2022)    Received from [facility]    Hunger Vital Sign     Worried About Running Out of Food in the Last Year: Never true     Ran Out of Food in the Last Year: Never true   Transportation Needs: No Transportation Needs (2/7/2022)    Received from [facility]    PRAPARE - Transportation     Lack of Transportation (Medical): No     Lack of Transportation (Non-Medical): No   Stress: Stress Concern Present (2/7/2022)    Received from [facility]    [employer] - Occupational Stress Questionnaire     Feeling of Stress : To some extent   Housing Stability: Low Risk  (2/7/2022)    Received from [facility]    Housing Stability Vital Sign     Unable to Pay for Housing in the Last Year: No     Number of Places Lived in the Last Year: 1     Unstable Housing in the Last Year: No       Allergies:  No Known Allergies     Objective:    VITALS: /72   Pulse 72   Temp 96.9 °F (36.1 °C) (Temporal)   Resp 16   Ht 5' 1\" (1.549 m)   Wt 151 lb (68.5 kg)   LMP  02/16/2025 (Exact Date)   SpO2 100%   BMI 28.53 kg/m²      BP Readings from Last 3 Encounters:   02/20/25 110/72   02/10/25 108/72   06/26/24 100/60     Wt Readings from Last 3 Encounters:   02/20/25 151 lb (68.5 kg)   02/10/25 143 lb 4.8 oz (65 kg)   06/26/24 143 lb (64.9 kg)     PHYSICAL EXAM  GEN: pleasant, well-appearing in NAD, AOX3  SKIN: no visible rashes, lesions, or evidence of trauma  HEENT: PERRL, EOMI, moist mucous membranes, oropharynx clear, TM clear, nares patent, no Thyromegaly or nodule. Upper eyelids with swelling.   CV: RRR, no murmurs or abnl heart sounds   PULM: Clear to auscultation, No wheezes, rales, rhonchi.  Non-labored breathing.  EXT:  Warm, well perfused, no lower extremity edema  NEURO: CNs grossly intact, no focal weakness  MSK: moves all 4 extremities without difficulty  PSYCH: mood and affect are appropriate         Approximately 45 minutes was spent: preparing to see the patient (reviewing prior tests, office notes, and consultant notes), personally obtaining a history, conducting a physical exam, counseling the patient on the plan of care, entering appropriate orders, and documenting clinical information in the electronic health record.         Kim Cade,     NOTE TO PATIENT: The 21st Century Cures Act makes clinical notes like these available to patients in the interest of transparency. Clinical notes are medical documents used by physicians and care providers to communicate with each other. These documents include medical language and terminology, abbreviations, and treatment information that may sound technical and at times possibly unfamiliar. In addition, at times, the verbiage may appear blunt or direct. These documents are one tool providers use to communicate relevant information and clinical opinions of the care providers in a way that allows common understanding of the clinical context.

## 2025-02-20 NOTE — PROGRESS NOTES
Family Medicine Progress Note  Assessment & Plan:   Dwaine Lee is a 39 year old female who is here for:     1. Alkaline phosphatase elevation  2. Elevated liver enzymes- presenting with diffuse fatigue, arthralgias, and myalgias >2 wks.  Also with elevation In liver enzymes/AP. No alcohol use; no prior elevation.  GGT+ , ESR/CRP elevated, KAVITHA negative. RUQ US with fatty infiltration; ? vIral Infection vs more insidious process.    - Plan to recheck labs as below in 2wks,   - Check inflamma markers givent he diffuse myalgia and arthralgias.   - Comp Metabolic Panel (14) [E]; Future  - Actin (Smooth Muscle) Antibody; Future  - Mitochondrial (M2) Antibody [E]; Future  - Ferritin [E]; Future    3. Myalgia  4. Polyarthralgia  5. Fatty infiltration of liver      6. Encounter for screening mammogram for malignant neoplasm of breast  - [facility] LUCIA 2D+3D SCREENING BILAT (CPT=77067/31654); Future       Follow-Up: No follow-ups on file.      Kim Cade,    06/26/24      CC: Urgent Care F/u (For shortness of breath) and Other (The following individual(s) verbally consented to be recorded using ambient AI listening technology and understand that they can each withdraw their consent to this listening technology at any point by asking the clinician to turn off or pause the recording://Patient name: Dwaine Lee /Additional names:  /  )    Subjective:    History of Present Illness:  History obtained from patient.     Dwaine Lee is a 39 year old female who presents for Urgent Care F/u (For shortness of breath) and Other (The following individual(s) verbally consented to be recorded using ambient AI listening technology and understand that they can each withdraw their consent to this listening technology at any point by asking the clinician to turn off or pause the recording://Patient name: Dwaine Lee /Additional names:  /  )           FATIGUE-Back from [place] 06/07-- started  feeling off on Tuesday and then Wed-Fri felt off---- Fatigued--- Body aches,   Labs completed with elevation in liver enzmes and Alk phos;  has had some loose stools but does not report any RUQ abd pain.  Does have notable fatigue where she has needed to lie down for a nap.  Also with joint pain that has involved almost all of her peripheral joints.   -Labs initially completed with elevated liver enzymes, repeated with increase and GGT+ , ESR/CRP elevated, KAVITHA negative. RUQ US with fatty infiltration.  Acute hepatitis panel was negative.  Since last visit pt thought she was doing better from a fatigue and muyalgias stand point but then she tried going to the city for work yest, theres a 10m walk---- She was exhausted and had notable myalgias.     Diet- unhealthy--not much cooking. Eating out. Could be better with F/V.  Heavy on carbs. MVN, inconsistent use.    Exercise-None   MH- \"feels confused\";  emotionally trigger, recent conflict with spouse;   quick to tears/\"breakdown\".      H/O GDM   Migraine  MDD/DYLAN- Wellbutrin 150mg -- Currently Psych and would prefer continued mgmt.   Pshx: CS x2, ?Removal Abd Mass  All: {NKDA  Fam hx: TB-B; Parkinsons-F   Soc hx: , 2 children.   Ob/gyne: Patient's last menstrual period was 2025 (exact date).. last pap: 2024, last mammogram: -,  ,   Colonoscopy: -       History/Other:   ROS-Per HPI     Problem List:  Patient Active Problem List   Diagnosis    Rh negative, antepartum (HCC)    Mild episode of recurrent major depressive disorder    DYLAN (generalized anxiety disorder)    Fatty infiltration of liver    Shortness of breath       Current Medications:  Current Outpatient Medications   Medication Sig Dispense Refill    buPROPion ER (WELLBUTRIN XL) 150 MG Oral Tablet 24 Hr Take 1 tablet (150 mg total) by mouth daily. 30 tablet 2    Multiple Vitamin (MULTIVITAMIN ADULT) Oral Tab       Vitamin D3, Cholecalciferol, (VITAMIN D3) 125 MCG (5000 UT) Oral Cap  Take 5,000 Units by mouth daily. (Patient not taking: Reported on 2025)        Past Medical History:  Past Medical History:    Anxiety    I guess a lot of it    Depression    maybe, jimmie    Gestational diabetes (HCC)    last pregnancy    Hyperlipidemia    Migraines    Obesity    Maybe, jimmie, plz check    Previous  delivery affecting pregnancy, delivered (HCC)    Typhoid    was in [place]      Past Surgical History:  Past Surgical History:   Procedure Laterality Date          non progressive labor- not our office           delivery only  2019    Ines Doran DO    Other surgical history  2019    cervical circlage    Other surgical history  2022    Excision abdominal wall mass       Family History:  Family History   Problem Relation Age of Onset    No Known Problems Mother     Hypertension Father     Other (Other) Father         parkinsens     Other (TB spine) Brother         recd complete rx      Social History:  Social History     Socioeconomic History    Marital status:    Tobacco Use    Smoking status: Never    Smokeless tobacco: Never   Vaping Use    Vaping status: Never Used   Substance and Sexual Activity    Alcohol use: Yes     Comment: once or twice a year :)    Drug use: Never    Sexual activity: Yes     Partners: Male   Other Topics Concern    Exercise Yes    Special Diet Yes    Stress Concern Yes    Weight Concern Yes     Social Drivers of Health     Food Insecurity: No Food Insecurity (2022)    Received from [facility]    Hunger Vital Sign     Worried About Running Out of Food in the Last Year: Never true     Ran Out of Food in the Last Year: Never true   Transportation Needs: No Transportation Needs (2022)    Received from [facility]    PRAPARE - Transportation     Lack of Transportation (Medical): No     Lack of Transportation (Non-Medical): No   Stress: Stress Concern Present (2022)    [facility]    [facility] of Occupational Health - Occupational Stress Questionnaire     Feeling of Stress : To some extent   Housing Stability: Low Risk  (2/7/2022)    Received from [facility]    Housing Stability Vital Sign     Unable to Pay for Housing in the Last Year: No     Number of Places Lived in the Last Year: 1     Unstable Housing in the Last Year: No       Allergies:  No Known Allergies     Objective:    VITALS: /72   Pulse 72   Temp 96.9 °F (36.1 °C) (Temporal)   Resp 16   Ht 5' 1\" (1.549 m)   Wt 151 lb (68.5 kg)   LMP 02/16/2025 (Exact Date)   SpO2 100%   BMI 28.53 kg/m²      BP Readings from Last 3 Encounters:   02/20/25 110/72   02/10/25 108/72   06/26/24 100/60     Wt Readings from Last 3 Encounters:   02/20/25 151 lb (68.5 kg)   02/10/25 143 lb 4.8 oz (65 kg)   06/26/24 143 lb (64.9 kg)     PHYSICAL EXAM  GEN: pleasant, well-appearing in NAD  SKIN: no visible rashes, lesions, or evidence of trauma  HEENT:  no conjunctivitis or scleral icterus; mmm  PULM: breathing comfortably on room air  MSK: moving all extremities well, no weakness or joint tenderness  NEURO: CNs grossly intact, no focal weakness, alert and oriented         Latest Reference Range & Units 06/15/24 08:10 06/21/24 13:59   Glucose 70 - 99 mg/dL 93 87   Sodium 136 - 145 mmol/L 141 136   Potassium 3.5 - 5.1 mmol/L 4.3 4.3   Chloride 98 - 112 mmol/L 111 108   Carbon Dioxide, Total 21.0 - 32.0 mmol/L 25.0 24.0   BUN 9 - 23 mg/dL 7 (L) 9   CREATININE 0.55 - 1.02 mg/dL 0.79 0.85   CALCIUM 8.5 - 10.1 mg/dL 8.6 9.0   EGFR >=60 mL/min/1.73m2 97 89   ANION GAP 0 - 18 mmol/L 5 4   CALCULATED OSMOLALITY 275 - 295 mOsm/kg 290 280   ALKALINE PHOSPHATASE 37 - 98 U/L 105 (H) 130 (H)   AST (SGOT) 15 - 37 U/L 71 (H) 65 (H)   ALT (SGPT) 13 - 56 U/L 187 (H) 194 (H)   Total Bilirubin 0.1 - 2.0 mg/dL 0.3 0.3   Globulin 2.8 - 4.4 g/dL 3.6 3.9   GAMMA GLUTAMYL TRANSFERASE 5 - 55 U/L  60 (H)   Lipase <=300 U/L  26    Vitamin B12 193 - 986 pg/mL 360    Cholesterol, Total <200 mg/dL 189    Triglycerides 30 - 149 mg/dL 165 (H)    HDL Cholesterol 40 - 59 mg/dL 59    VLDL 0 - 30 mg/dL 28    NON-HDL CHOLESTEROL <130 mg/dL 130 (H)    LDL Cholesterol Calc <100 mg/dL 102 (H)    CK 26 - 192 U/L  44   A/G Ratio 1.0 - 2.0  1.0 1.0   PROTEIN, TOTAL 6.4 - 8.2 g/dL 7.1 7.8   Albumin 3.4 - 5.0 g/dL 3.5 3.9   VITAMIN D, 25-OH, TOTAL 30.0 - 100.0 ng/mL 30.2    C-REACTIVE PROTEIN <0.30 mg/dL  0.88 (H)   Patient Fasting for CMP?  Yes No   Patient Fasting for Lipid?  Yes    TSH 0.358 - 3.740 mI 1.240    HEPATITIS A AB, IGM Nonreactive    Nonreactive   HBc IgM AB Nonreactive    Nonreactive   HBSAg Screen Nonreactive    Nonreactive   HCV AB Nonreactive    Nonreactive   Expanded JESUS Antibody Screen, IGG <0.7 ug/l  0.20   Connective Tissue Disease Screen Interpretation Negative   Negative   Anti-dsDNA antibody <10 IU/mL  3.3   WBC 4.0 - 11.0 x10(3) uL 5.5    Hemoglobin 12.0 - 16.0 g/dL 13.5    Hematocrit 35.0 - 48.0 % 37.7    Platelet Count 150.0 - 450.0 10(3)uL 192.0    RBC 3.80 - 5.30 x10( 4.58    MCH 26.0 - 34.0 pg 29.5    MCHC 31.0 - 37.0 g/dL 35.8    MCV 80.0 - 100.0 fL 82.3    RDW % 12.6    Prelim Neutrophil Abs 1.50 - 7.70 x10 (3) uL 2.77    Neutrophils Absolute 1.50 - 7.70 x10(3) uL 2.77    Lymphocytes Absolute 1.00 - 4.00 x10(3) uL 1.97    Monocytes Absolute 0.10 - 1.00 x10(3) uL 0.57    Eosinophils Absolute 0.00 - 0.70 x10(3) uL 0.16    Basophils Absolute 0.00 - 0.20 x10(3) uL 0.03    Immature Granulocyte Absolute 0.00 - 1.00 x10(3) uL 0.01    Neutrophils % % 50.3    Lymphocytes % % 35.8    Monocytes % % 10.3    Eosinophils % % 2.9    Basophils % % 0.5    Immature Granulocyte % % 0.2    SLIDE REVIEW  Slide reviewed, normal WBC, RBC, and platelet morphology.   SED RATE 0 - 20 mm/Hr  19   (L): Data is abnormally low  (H): Data is abnormally high    No results found.         Kim Cade, DO    NOTE TO PATIENT: The 21st Century Cures Act  makes clinical notes like these available to patients in the interest of transparency. Clinical notes are medical documents used by physicians and care providers to communicate with each other. These documents include medical language and terminology, abbreviations, and treatment information that may sound technical and at times possibly unfamiliar. In addition, at times, the verbiage may appear blunt or direct. These documents are one tool providers use to communicate relevant information and clinical opinions of the care providers in a way that allows common understanding of the clinical context.

## 2025-02-28 ENCOUNTER — ORDER TRANSCRIPTION (OUTPATIENT)
Dept: ADMINISTRATIVE | Facility: HOSPITAL | Age: 40
End: 2025-02-28

## 2025-02-28 DIAGNOSIS — Z13.6 SCREENING FOR CARDIOVASCULAR CONDITION: Primary | ICD-10-CM

## 2025-03-07 ENCOUNTER — HOSPITAL ENCOUNTER (OUTPATIENT)
Dept: CT IMAGING | Age: 40
Discharge: HOME | End: 2025-03-07
Attending: INTERNAL MEDICINE

## 2025-03-07 ENCOUNTER — LAB ENCOUNTER (OUTPATIENT)
Dept: LAB | Age: 40
End: 2025-03-07
Attending: INTERNAL MEDICINE
Payer: COMMERCIAL

## 2025-03-07 DIAGNOSIS — R06.02 SHORTNESS OF BREATH: Primary | ICD-10-CM

## 2025-03-07 DIAGNOSIS — R74.8 ACID PHOSPHATASE ELEVATED: ICD-10-CM

## 2025-03-07 DIAGNOSIS — R74.8 ELEVATED LIVER ENZYMES: ICD-10-CM

## 2025-03-07 DIAGNOSIS — Z13.6 SCREENING FOR CARDIOVASCULAR CONDITION: ICD-10-CM

## 2025-03-07 DIAGNOSIS — R63.5 ABNORMAL WEIGHT GAIN: ICD-10-CM

## 2025-03-07 LAB
ALBUMIN SERPL-MCNC: 4.7 G/DL (ref 3.2–4.8)
ALBUMIN/GLOB SERPL: 1.8 {RATIO} (ref 1–2)
ALP LIVER SERPL-CCNC: 79 U/L
ALT SERPL-CCNC: 30 U/L
ANION GAP SERPL CALC-SCNC: 9 MMOL/L (ref 0–18)
AST SERPL-CCNC: 23 U/L (ref ?–34)
BILIRUB SERPL-MCNC: 0.5 MG/DL (ref 0.3–1.2)
BILIRUB UR QL STRIP.AUTO: NEGATIVE
BUN BLD-MCNC: 12 MG/DL (ref 9–23)
CALCIUM BLD-MCNC: 9.2 MG/DL (ref 8.7–10.6)
CHLORIDE SERPL-SCNC: 106 MMOL/L (ref 98–112)
CHOLEST SERPL-MCNC: 223 MG/DL (ref ?–200)
CLARITY UR REFRACT.AUTO: CLEAR
CO2 SERPL-SCNC: 24 MMOL/L (ref 21–32)
CREAT BLD-MCNC: 0.81 MG/DL
CREAT UR-SCNC: 112.5 MG/DL
CRP SERPL-MCNC: 0.5 MG/DL (ref ?–0.5)
D DIMER PPP FEU-MCNC: <0.27 UG/ML FEU (ref ?–0.5)
DEPRECATED HBV CORE AB SER IA-ACNC: 31 NG/ML
EGFRCR SERPLBLD CKD-EPI 2021: 93 ML/MIN/1.73M2 (ref 60–?)
ERYTHROCYTE [SEDIMENTATION RATE] IN BLOOD: 16 MM/HR
FASTING PATIENT LIPID ANSWER: YES
FASTING STATUS PATIENT QL REPORTED: YES
FOLATE SERPL-MCNC: 18.6 NG/ML (ref 5.4–?)
FSH SERPL-ACNC: 4.5 MIU/ML
GGT SERPL-CCNC: 25 U/L
GLOBULIN PLAS-MCNC: 2.6 G/DL (ref 2–3.5)
GLUCOSE BLD-MCNC: 97 MG/DL (ref 70–99)
GLUCOSE UR STRIP.AUTO-MCNC: NORMAL MG/DL
HDLC SERPL-MCNC: 55 MG/DL (ref 40–59)
KETONES UR STRIP.AUTO-MCNC: NEGATIVE MG/DL
LDLC SERPL CALC-MCNC: 143 MG/DL (ref ?–100)
LEUKOCYTE ESTERASE UR QL STRIP.AUTO: NEGATIVE
LH SERPL-ACNC: 5.6 MIU/ML
MICROALBUMIN UR-MCNC: <0.3 MG/DL
NITRITE UR QL STRIP.AUTO: NEGATIVE
NONHDLC SERPL-MCNC: 168 MG/DL (ref ?–130)
OSMOLALITY SERPL CALC.SUM OF ELEC: 288 MOSM/KG (ref 275–295)
PH UR STRIP.AUTO: 5.5 [PH] (ref 5–8)
POTASSIUM SERPL-SCNC: 4.1 MMOL/L (ref 3.5–5.1)
PROT SERPL-MCNC: 7.3 G/DL (ref 5.7–8.2)
PROT UR STRIP.AUTO-MCNC: NEGATIVE MG/DL
SODIUM SERPL-SCNC: 139 MMOL/L (ref 136–145)
SP GR UR STRIP.AUTO: 1.02 (ref 1–1.03)
TRIGL SERPL-MCNC: 138 MG/DL (ref 30–149)
TSI SER-ACNC: 1.15 UIU/ML (ref 0.55–4.78)
UROBILINOGEN UR STRIP.AUTO-MCNC: NORMAL MG/DL
VIT B12 SERPL-MCNC: 432 PG/ML (ref 211–911)
VIT D+METAB SERPL-MCNC: 44.3 NG/ML (ref 30–100)
VLDLC SERPL CALC-MCNC: 26 MG/DL (ref 0–30)

## 2025-03-07 PROCEDURE — 83516 IMMUNOASSAY NONANTIBODY: CPT | Performed by: FAMILY MEDICINE

## 2025-03-07 PROCEDURE — 83001 ASSAY OF GONADOTROPIN (FSH): CPT | Performed by: FAMILY MEDICINE

## 2025-03-07 PROCEDURE — 82728 ASSAY OF FERRITIN: CPT | Performed by: FAMILY MEDICINE

## 2025-03-07 PROCEDURE — 82306 VITAMIN D 25 HYDROXY: CPT | Performed by: FAMILY MEDICINE

## 2025-03-07 PROCEDURE — 82607 VITAMIN B-12: CPT | Performed by: FAMILY MEDICINE

## 2025-03-07 PROCEDURE — 84443 ASSAY THYROID STIM HORMONE: CPT | Performed by: FAMILY MEDICINE

## 2025-03-07 PROCEDURE — 81001 URINALYSIS AUTO W/SCOPE: CPT | Performed by: FAMILY MEDICINE

## 2025-03-07 PROCEDURE — 80053 COMPREHEN METABOLIC PANEL: CPT | Performed by: FAMILY MEDICINE

## 2025-03-07 PROCEDURE — 82977 ASSAY OF GGT: CPT | Performed by: FAMILY MEDICINE

## 2025-03-07 PROCEDURE — 85652 RBC SED RATE AUTOMATED: CPT | Performed by: FAMILY MEDICINE

## 2025-03-07 PROCEDURE — 82043 UR ALBUMIN QUANTITATIVE: CPT | Performed by: FAMILY MEDICINE

## 2025-03-07 PROCEDURE — 85379 FIBRIN DEGRADATION QUANT: CPT | Performed by: FAMILY MEDICINE

## 2025-03-07 PROCEDURE — 83002 ASSAY OF GONADOTROPIN (LH): CPT | Performed by: FAMILY MEDICINE

## 2025-03-07 PROCEDURE — 82570 ASSAY OF URINE CREATININE: CPT | Performed by: FAMILY MEDICINE

## 2025-03-07 PROCEDURE — 86140 C-REACTIVE PROTEIN: CPT | Performed by: FAMILY MEDICINE

## 2025-03-07 PROCEDURE — 84410 TESTOSTERONE BIOAVAILABLE: CPT | Performed by: FAMILY MEDICINE

## 2025-03-07 PROCEDURE — 82746 ASSAY OF FOLIC ACID SERUM: CPT | Performed by: FAMILY MEDICINE

## 2025-03-08 LAB
ACTIN SMOOTH MUSCLE AB: 8 UNITS
M2 MITOCHONDRIAL AB: <20 UNITS

## 2025-03-10 LAB — LIPOPROTEIN (A): 214.8 NMOL/L

## 2025-03-12 LAB
SEX HORM BIND GLOB: 39.9 NMOL/L
TESTOST % FREE+WEAK BND: 16.4 %
TESTOST FREE+WEAK BND: 5.5 NG/DL
TESTOSTERONE TOT /MS: 33.8 NG/DL

## 2025-03-31 ENCOUNTER — TELEPHONE (OUTPATIENT)
Dept: FAMILY MEDICINE CLINIC | Facility: CLINIC | Age: 40
End: 2025-03-31

## 2025-03-31 NOTE — TELEPHONE ENCOUNTER
Patient schedule a follow-up for 5-9-25 (put on the wait list too.    She wants the appointment earlier than 5-2-25 due to her having f/u on the this date.  She is suppose to see PCP before her next Cardio appointment  (as per patient)

## 2025-03-31 NOTE — TELEPHONE ENCOUNTER
Dr. Cade please advise can you accommodate a sooner appointment for patient?  Patient is to follow up with Cardio on 5/2/25 asking to see you before then.     Ok for a 20 min appointment or does appointment need to be 40 min?    LOV 2/20/25  Return in about 2 months (around 4/20/2025)     Future Appointments   Date Time Provider Department Center   5/9/2025  2:00 PM Kim Cade, DO [facility]

## 2025-04-08 NOTE — TELEPHONE ENCOUNTER
Scheduled     Future Appointments   Date Time Provider Department Center   4/18/2025 12:40 PM Kim Cade DO [facility]   5/9/2025  2:00 PM Kim Cade DO [facility]

## 2025-04-18 ENCOUNTER — OFFICE VISIT (OUTPATIENT)
Dept: FAMILY MEDICINE CLINIC | Facility: CLINIC | Age: 40
End: 2025-04-18
Payer: COMMERCIAL

## 2025-04-18 VITALS
SYSTOLIC BLOOD PRESSURE: 108 MMHG | RESPIRATION RATE: 14 BRPM | BODY MASS INDEX: 28.32 KG/M2 | OXYGEN SATURATION: 99 % | HEIGHT: 61 IN | HEART RATE: 63 BPM | DIASTOLIC BLOOD PRESSURE: 82 MMHG | TEMPERATURE: 98 F | WEIGHT: 150 LBS

## 2025-04-18 DIAGNOSIS — E78.41 ELEVATED LP(A): ICD-10-CM

## 2025-04-18 DIAGNOSIS — E66.3 OVERWEIGHT (BMI 25.0-29.9): ICD-10-CM

## 2025-04-18 DIAGNOSIS — Z63.0 PROBLEMS IN RELATIONSHIP WITH SPOUSE OR PARTNER: ICD-10-CM

## 2025-04-18 DIAGNOSIS — E78.5 HYPERLIPIDEMIA, UNSPECIFIED HYPERLIPIDEMIA TYPE: Primary | ICD-10-CM

## 2025-04-18 PROCEDURE — 3079F DIAST BP 80-89 MM HG: CPT | Performed by: FAMILY MEDICINE

## 2025-04-18 PROCEDURE — 3008F BODY MASS INDEX DOCD: CPT | Performed by: FAMILY MEDICINE

## 2025-04-18 PROCEDURE — 99214 OFFICE O/P EST MOD 30 MIN: CPT | Performed by: FAMILY MEDICINE

## 2025-04-18 PROCEDURE — 3074F SYST BP LT 130 MM HG: CPT | Performed by: FAMILY MEDICINE

## 2025-04-18 RX ORDER — ATORVASTATIN CALCIUM 20 MG/1
TABLET, FILM COATED ORAL
COMMUNITY
Start: 2025-03-21

## 2025-04-18 SDOH — SOCIAL STABILITY - SOCIAL INSECURITY: PROBLEMS IN RELATIONSHIP WITH SPOUSE OR PARTNER: Z63.0

## 2025-04-18 NOTE — PROGRESS NOTES
Family Medicine Progress Note  Assessment & Plan:   Follow-Up: Return in about 3 months (around 7/18/2025) for Annual.            Assessment & Plan  Hyperlipidemia, unspecified hyperlipidemia type  Elevated Lp(a)  Elevated LDL from 108 mg/dL to 140 mg/dL. Genetic testing shows elevated apolipoprotein A, indicating higher cardiovascular risk. Family history of cardiovascular disease. Prefers lifestyle modifications over statins despite cardiologist's recommendation.  - Hold off on starting statins.  - Encourage lifestyle modifications, including regular exercise and dietary changes.  - Reevaluate lipid profile in 3 months.  - Some supplements may help lower LipA-omega-3 fatty acids (l fish oil), flaxseed, red yeast rice, niacin, coenzyme Q10, curcumin, and green tea extract  - Dietary interventions, particularly plant-based diets, may help lower Lp(a) levels. Studies have shown that transitioning to a whole food, plant-based diet rich in fruits, vegetables, nuts, beans, and oatmeal can lead to significant reductions in Lp(a) levels within a relatively short perio  Overweight (BMI 25.0-29.9)  Weight gain after stopping bupropion and previous unhealthy lifestyle. Actively improving diet and exercise. Increased muscle mass from strength training may affect weight.  - Encourage continued exercise and strength training.  - Focus on body composition changes rather than weight alone.  - Consider re-evaluation of body composition at the gym.  Problems in relationship with spouse or partner  Patient declined follow-up to learn about resources for needs identified.  Referral placed if patient requested resources.   Screening done by MA, provider did not discuss in detail with patient.          Subjective:    History of Present Illness:  History obtained from patient.   CC: Follow - Up (Pt needed to see PCP before her next cardiology appt)    History of Present Illness  David Lee is a 39 year old female who presents  for discussion about starting statin therapy.    Since last visit, she has seen cardiology with benign cardiac work-up.   Recommendation for starting Statin given the elevation in her ApoB   H/O elevated cholesterol levels, with her LDL cholesterol recently increasing from 108 mg/dL in  to 140 mg/dL.   Extensive cardiac evaluation:  stress test, echocardiogram, CT scan, blood work, and a week-long heart monitor, all of which were normal  High levels of apolipoprotein B and lipoprotein A. She has a significant family history of cardiovascular disease on her father's side, including an uncle who  of a heart attack at 55, a grandmother who  suddenly at 70, and her father who had hypertension and Parkinson's disease. There is also a history of high blood pressure in her family.    Lifestyle changes: Joining a gym and attending Conductor classes 3-4/WK  Diet- more home-cooked meals, working on reducing her carbohydrate intake, and increasing her protein intake.   No tobacco;  alcohol only occasionally, about twice a year.   previously stopped taking bupropion, which had some appetite suppressant effects, and has since gained weight.      H/O GDM   Migraine  MDD/DYLAN- Wellbutrin 150mg -- Currently Psych and would prefer continued mgmt.   Pshx: CS x2, ?Removal Abd Mass  All: {NKDA  Fam hx: TB-B; Parkinsons-F   Soc hx: , 2 children.   Ob/gyne: Patient's last menstrual period was 2025 (exact date).. last pap: 2024, last mammogram: -,  ,   Colonoscopy: -     History/Other:   ROS-Per HPI     Problem List:  Problem List[1]    Current Medications:  Current Medications[2]   Past Medical History:  Past Medical History[3]   Past Surgical History:  Past Surgical History[4]   Family History:  Family History[5]   Social History:  Short Social Hx on File[6]    Allergies:  Allergies[7]     Objective:    VITALS: /82   Pulse 63   Temp 97.8 °F (36.6 °C) (Temporal)   Resp 14   Ht 5' 1\"  (1.549 m)   Wt 150 lb (68 kg)   LMP 02/16/2025 (Exact Date)   SpO2 99%   BMI 28.34 kg/m²      BP Readings from Last 3 Encounters:   04/18/25 108/82   02/20/25 110/72   02/10/25 108/72     Wt Readings from Last 3 Encounters:   04/18/25 150 lb (68 kg)   02/20/25 151 lb (68.5 kg)   02/10/25 143 lb 4.8 oz (65 kg)         PHYSICAL EXAM  GEN: pleasant, well-appearing in NAD  SKIN: no visible rashes, lesions, or evidence of trauma  HEENT:  no conjunctivitis or scleral icterus; mmm  PULM: breathing comfortably on room air  MSK: moving all extremities well, no weakness or joint tenderness  NEURO: CNs grossly intact, no focal weakness, alert and oriented     Lab Results   Component Value Date    CHOLEST 223 (H) 03/07/2025    TRIG 138 03/07/2025    HDL 55 03/07/2025     (H) 03/07/2025    VLDL 26 03/07/2025    NONHDLC 168 (H) 03/07/2025     Approximately 45 minutes was spent: preparing to see the patient (reviewing prior tests, office notes, and consultant notes), personally obtaining a history, conducting a physical exam, counseling the patient on the plan of care, entering appropriate orders, and documenting clinical information in the electronic health record.         Kim Cade,     NOTE TO PATIENT: The 21st Century Cures Act makes clinical notes like these available to patients in the interest of transparency. Clinical notes are medical documents used by physicians and care providers to communicate with each other. These documents include medical language and terminology, abbreviations, and treatment information that may sound technical and at times possibly unfamiliar. In addition, at times, the verbiage may appear blunt or direct. These documents are one tool providers use to communicate relevant information and clinical opinions of the care providers in a way that allows common understanding of the clinical context.       Supplementary Documentation:      Patient declined follow-up to learn about  resources for needs identified.               [1]   Patient Active Problem List  Diagnosis    Rh negative, antepartum (HCC)    Mild episode of recurrent major depressive disorder    DYLAN (generalized anxiety disorder)    Fatty infiltration of liver    Shortness of breath    Elevated Lp(a)    Hyperlipidemia    Overweight (BMI 25.0-29.9)   [2]   Current Outpatient Medications   Medication Sig Dispense Refill    atorvastatin 20 MG Oral Tab atorvastatin 20 mg tablet, [RxNorm: 979009] (Patient not taking: Reported on 2025)      buPROPion ER (WELLBUTRIN XL) 150 MG Oral Tablet 24 Hr Take 1 tablet (150 mg total) by mouth daily. (Patient not taking: Reported on 2025) 30 tablet 2    Vitamin D3, Cholecalciferol, (VITAMIN D3) 125 MCG (5000 UT) Oral Cap Take 5,000 Units by mouth daily. (Patient not taking: Reported on 2025)      Multiple Vitamin (MULTIVITAMIN ADULT) Oral Tab  (Patient not taking: Reported on 2025)     [3]   Past Medical History:   Anxiety    I guess a lot of it    Depression    maybe, jimmie    Gestational diabetes (HCC)    last pregnancy    Hyperlipidemia    Migraines    Obesity    Maybe, jimmie, plz check    Previous  delivery affecting pregnancy, delivered (HCC)    Typhoid    was in [place]   [4]   Past Surgical History:  Procedure Laterality Date          non progressive labor- not our office      2019     delivery only  2019    Ines Doran DO    Other surgical history  2019    cervical circlage    Other surgical history  2022    Excision abdominal wall mass    [5]   Family History  Problem Relation Age of Onset    No Known Problems Mother     Hypertension Father     Other (Other) Father         parkinsens     Other (TB spine) Brother         recd complete rx    Heart Attack Paternal Uncle          in 50s   [6]   Social History  Socioeconomic History    Marital status:    Tobacco Use    Smoking status: Never    Smokeless tobacco:  Never   Vaping Use    Vaping status: Never Used   Substance and Sexual Activity    Alcohol use: Yes     Comment: once or twice a year :)    Drug use: Never    Sexual activity: Yes     Partners: Male   Other Topics Concern    Exercise Yes    Special Diet Yes    Stress Concern Yes    Weight Concern Yes     Social Drivers of Health     Food Insecurity: No Food Insecurity (4/18/2025)    NCSS - Food Insecurity     Worried About Running Out of Food in the Last Year: No     Ran Out of Food in the Last Year: No   Transportation Needs: No Transportation Needs (4/18/2025)    NCSS - Transportation     Lack of Transportation: No   Stress: Stress Concern Present (2/7/2022)    Received from [facility] and [facility] Occupational Health - Occupational Stress Questionnaire     Feeling of Stress : To some extent   Housing Stability: Not At Risk (4/18/2025)    NCSS - Housing/Utilities     Has Housing: Yes     Worried About Losing Housing: No     Unable to Get Utilities: No   [7] No Known Allergies

## 2025-05-12 PROBLEM — E78.5 HYPERLIPIDEMIA: Status: ACTIVE | Noted: 2025-05-12

## 2025-05-12 PROBLEM — E66.3 OVERWEIGHT (BMI 25.0-29.9): Status: ACTIVE | Noted: 2025-05-12

## 2025-05-12 PROBLEM — E78.41 ELEVATED LP(A): Status: ACTIVE | Noted: 2025-05-12

## 2025-05-12 NOTE — PROGRESS NOTES
The following individual(s) verbally consented to be recorded using ambient AI listening technology and understand that they can each withdraw their consent to this listening technology at any point by asking the clinician to turn off or pause the recording:    Patient name: Dwaine Lee

## 2025-05-12 NOTE — ASSESSMENT & PLAN NOTE
Elevated LDL from 108 mg/dL to 140 mg/dL. Genetic testing shows elevated apolipoprotein A, indicating higher cardiovascular risk. Family history of cardiovascular disease. Prefers lifestyle modifications over statins despite cardiologist's recommendation.  - Hold off on starting statins.  - Encourage lifestyle modifications, including regular exercise and dietary changes.  - Reevaluate lipid profile in 3 months.  - Some supplements may help lower LipA-omega-3 fatty acids (l fish oil), flaxseed, red yeast rice, niacin, coenzyme Q10, curcumin, and green tea extract  - Dietary interventions, particularly plant-based diets, may help lower Lp(a) levels. Studies have shown that transitioning to a whole food, plant-based diet rich in fruits, vegetables, nuts, beans, and oatmeal can lead to significant reductions in Lp(a) levels within a relatively short perio

## 2025-05-12 NOTE — ASSESSMENT & PLAN NOTE
Weight gain after stopping bupropion and previous unhealthy lifestyle. Actively improving diet and exercise. Increased muscle mass from strength training may affect weight.  - Encourage continued exercise and strength training.  - Focus on body composition changes rather than weight alone.  - Consider re-evaluation of body composition at the gym.

## 2025-07-30 ENCOUNTER — NURSE TRIAGE (OUTPATIENT)
Dept: FAMILY MEDICINE CLINIC | Facility: CLINIC | Age: 40
End: 2025-07-30

## 2025-08-12 ENCOUNTER — OFFICE VISIT (OUTPATIENT)
Dept: FAMILY MEDICINE CLINIC | Facility: CLINIC | Age: 40
End: 2025-08-12

## 2025-08-12 ENCOUNTER — HOSPITAL ENCOUNTER (OUTPATIENT)
Dept: ULTRASOUND IMAGING | Facility: HOSPITAL | Age: 40
Discharge: HOME | End: 2025-08-12
Attending: FAMILY MEDICINE

## 2025-08-12 VITALS
WEIGHT: 147 LBS | SYSTOLIC BLOOD PRESSURE: 102 MMHG | HEART RATE: 66 BPM | OXYGEN SATURATION: 98 % | DIASTOLIC BLOOD PRESSURE: 70 MMHG | HEIGHT: 61 IN | BODY MASS INDEX: 27.75 KG/M2 | RESPIRATION RATE: 16 BRPM | TEMPERATURE: 98 F

## 2025-08-12 DIAGNOSIS — E66.3 OVERWEIGHT (BMI 25.0-29.9): ICD-10-CM

## 2025-08-12 DIAGNOSIS — M79.89 RIGHT LEG SWELLING: ICD-10-CM

## 2025-08-12 DIAGNOSIS — M79.89 RIGHT LEG SWELLING: Primary | ICD-10-CM

## 2025-08-12 DIAGNOSIS — L81.9 ATYPICAL PIGMENTED SKIN LESION: ICD-10-CM

## 2025-08-12 PROBLEM — E78.5 DYSLIPIDEMIA: Status: ACTIVE | Noted: 2025-03-21

## 2025-08-12 PROCEDURE — 3078F DIAST BP <80 MM HG: CPT | Performed by: FAMILY MEDICINE

## 2025-08-12 PROCEDURE — 3074F SYST BP LT 130 MM HG: CPT | Performed by: FAMILY MEDICINE

## 2025-08-12 PROCEDURE — 3008F BODY MASS INDEX DOCD: CPT | Performed by: FAMILY MEDICINE

## 2025-08-12 PROCEDURE — 93971 EXTREMITY STUDY: CPT | Performed by: FAMILY MEDICINE

## 2025-08-12 PROCEDURE — 99213 OFFICE O/P EST LOW 20 MIN: CPT | Performed by: FAMILY MEDICINE

## (undated) DEVICE — SURGICEL

## (undated) NOTE — LETTER
April 3, 2019    Re: Marcie Calvert    1985      To Whom It May Concern:    I am writing this letter on behalf of my patient, Marcie Calvert.   Due to a complication in her pregnancy I have advised her to be on modified bedrest and

## (undated) NOTE — Clinical Note
Follow-up Growth ultrasound at 32 weeks. Weekly NST's at 36 weeks. I advised admission this evening for a rescue cerclage. Since she ate at 78 we probably need to wait until 5:30 PM to do the surgery.   In light of the later surgical time, I would advise th

## (undated) NOTE — LETTER
[facility]  Cesar Smith 59 [id number] [facility], [address] [place] [address]    Consent for Operation    Date: ________3/28/2019_________    Time: ________1610_______    1.  I authorize the performance upon Dwaine Lee the following operation: 5. I consent to the photographing or videotaping of the operations or procedures to be performed, including appropriate portions of my body for medical, scientific, or educational purposes, provided my identity is not revealed by the pictures or by descrip 5. My surgeon/physician has discussed the potential benefits, risks, and side effects of this procedure; the likelihood of achieving goals; and potential problems that might occur during recuperation.  They also discussed reasonable alternatives to the proc a. Allow the anesthesiologist (anesthesia doctor) to give me medicine and do additional procedures as necessary.  Some examples are: Starting or using an “IV” to give memedicine, fluids or blood during my procedure, and having a breathing tube placed to hel 7. Regional Anesthesia (“spinal”, “epidural”, & “nerve blocks”): I understand that rare but potential complications include headache, bleeding, infection, seizure, irregular heart rhythms, and nerve injury.     I can change my mind about having anesthesia

## (undated) NOTE — LETTER
[facility]  Cesar Smith [id number] [facility], [address] [place] [address]    Consent for Operation    Date: __________________    Time: _______________    1.  I authorize the performance upon Dwaine Lee the following operation:                              Repe 5. I consent to the photographing or videotaping of the operations or procedures to be performed, including appropriate portions of my body for medical, scientific, or educational purposes, provided my identity is not revealed by the pictures or by descrip 5. My surgeon/physician has discussed the potential benefits, risks, and side effects of this procedure; the likelihood of achieving goals; and potential problems that might occur during recuperation.  They also discussed reasonable alternatives to the proc a. Allow the anesthesiologist (anesthesia doctor) to give me medicine and do additional procedures as necessary.  Some examples are: Starting or using an “IV” to give memedicine, fluids or blood during my procedure, and having a breathing tube placed to hel 7. Regional Anesthesia (“spinal”, “epidural”, & “nerve blocks”): I understand that rare but potential complications include headache, bleeding, infection, seizure, irregular heart rhythms, and nerve injury.     I can change my mind about having anesthesia